# Patient Record
Sex: MALE | Race: WHITE | NOT HISPANIC OR LATINO | Employment: FULL TIME | URBAN - METROPOLITAN AREA
[De-identification: names, ages, dates, MRNs, and addresses within clinical notes are randomized per-mention and may not be internally consistent; named-entity substitution may affect disease eponyms.]

---

## 2017-02-08 ENCOUNTER — ALLSCRIPTS OFFICE VISIT (OUTPATIENT)
Dept: OTHER | Facility: OTHER | Age: 47
End: 2017-02-08

## 2017-04-10 ENCOUNTER — GENERIC CONVERSION - ENCOUNTER (OUTPATIENT)
Dept: OTHER | Facility: OTHER | Age: 47
End: 2017-04-10

## 2017-07-26 ENCOUNTER — ALLSCRIPTS OFFICE VISIT (OUTPATIENT)
Dept: OTHER | Facility: OTHER | Age: 47
End: 2017-07-26

## 2017-07-26 DIAGNOSIS — M25.511 PAIN IN RIGHT SHOULDER: ICD-10-CM

## 2017-11-27 ENCOUNTER — GENERIC CONVERSION - ENCOUNTER (OUTPATIENT)
Dept: OTHER | Facility: OTHER | Age: 47
End: 2017-11-27

## 2017-12-22 ENCOUNTER — GENERIC CONVERSION - ENCOUNTER (OUTPATIENT)
Dept: OTHER | Facility: OTHER | Age: 47
End: 2017-12-22

## 2017-12-22 ENCOUNTER — OFFICE VISIT (OUTPATIENT)
Dept: URGENT CARE | Age: 47
End: 2017-12-22
Payer: COMMERCIAL

## 2017-12-22 PROCEDURE — S9083 URGENT CARE CENTER GLOBAL: HCPCS | Performed by: FAMILY MEDICINE

## 2017-12-22 PROCEDURE — 93005 ELECTROCARDIOGRAM TRACING: CPT

## 2017-12-22 PROCEDURE — G0383 LEV 4 HOSP TYPE B ED VISIT: HCPCS | Performed by: FAMILY MEDICINE

## 2017-12-23 LAB
ATRIAL RATE: 76 BPM
ATRIAL RATE: 78 BPM
P AXIS: 56 DEGREES
P AXIS: 57 DEGREES
PR INTERVAL: 136 MS
PR INTERVAL: 142 MS
QRS AXIS: 63 DEGREES
QRS AXIS: 71 DEGREES
QRSD INTERVAL: 78 MS
QRSD INTERVAL: 82 MS
QT INTERVAL: 366 MS
QT INTERVAL: 372 MS
QTC INTERVAL: 411 MS
QTC INTERVAL: 424 MS
T WAVE AXIS: 38 DEGREES
T WAVE AXIS: 41 DEGREES
VENTRICULAR RATE: 76 BPM
VENTRICULAR RATE: 78 BPM

## 2017-12-28 ENCOUNTER — ALLSCRIPTS OFFICE VISIT (OUTPATIENT)
Dept: OTHER | Facility: OTHER | Age: 47
End: 2017-12-28

## 2017-12-29 NOTE — PROGRESS NOTES
Assessment   1  Acute bacterial sinusitis (461 9) (J01 90,B96 89)    Plan   Acute bacterial sinusitis    · Amoxicillin 875 MG Oral Tablet; TAKE 1 TABLET EVERY 12 HOURS DAILY    Discussion/Summary      Given the persistence of symptoms and sick contacts with children at home, he likely has an acute bacterial bronchitis  He was given Amoxicillin to complete and to continue to monitor symptoms  Otherwise no other changes were made  The patient was counseled regarding instructions for management  Possible side effects of new medications were reviewed with the patient/guardian today  The treatment plan was reviewed with the patient/guardian  The patient/guardian understands and agrees with the treatment plan      Chief Complaint   1  Cold Symptoms  Patient is here for cough and lost voice      History of Present Illness   HPI: Siobhan Bhatia is here today for a sick visit  Symptoms started about 2 weeks ago  He has not noted any improvement since  Fevers have improved but still notes the below symptoms  He reports trying to rest to help  Cold Symptoms:      Associated symptoms include hoarseness,-- productive cough,-- facial pain,-- plugged ear(s),-- fatigue,-- nausea-- and-- fever, but-- no scratchy throat-- and-- no sore throat  Active Problems   1  Acid reflux (530 81) (K21 9)   2  Anxiety (300 00) (F41 9)   3  Arthralgia of right shoulder region (719 41) (M25 511)   4  Epistaxis (784 7) (R04 0)   5  Herniated nucleus pulposus, C4-5 left (722 0) (M50 221)   6  Hyperlipidemia (272 4) (E78 5)   7  Insomnia (780 52) (G47 00)   8  Lipoma (214 9) (D17 9)   9  Seasonal allergies (477 9) (J30 2)   10  Seborrheic keratosis (702 19) (L82 1)   11  Vitamin D deficiency (268 9) (E55 9)    Social History    · Being A Social Drinker   · Marital History - Currently    · Never a smoker   · Work History   · Working Full Time   · audio/tv installment in houses    Current Meds    1   Aspirin 81 MG TABS; TAKE 1 TABLET DAILY; Therapy: 17QBA8767 to (Evaluate:25Cyt3529) Recorded   2  Atorvastatin Calcium 40 MG Oral Tablet; TAKE 1 TABLET DAILY; Therapy: 64VEQ8306 to (Evaluate:05Apr2018)  Requested for: 10Apr2017; Last     Rx:10Apr2017 Ordered   3  Diclofenac Potassium 50 MG Oral Tablet; TAKE 1 TABLET 2 TO 3 TIMES DAILY AFTER     MEALS; Therapy: 88TWL1457 to (Evaluate:62Jnw9907)  Requested for: 99AOO8042; Last     Rx:14Wxt4479 Ordered   4  Montelukast Sodium 10 MG Oral Tablet; TAKE 1 TABLET BY MOUTH ONCE DAILY; Therapy: 23DMK2088 to (Evaluate:15Mar2016)  Requested for: 87OYM1251; Last     Rx:29Enh9027 Ordered   5  Multiple Vitamins Essential Oral Tablet; Therapy: 75YSY4643 to Recorded   6  Ondansetron HCl - 8 MG Oral Tablet; Take one tab every 8 hours as needed for nausea; Therapy: 36Soz1046 to (Last [place])  Requested for: 50Xfg0168 Ordered   7  Ventolin  (90 Base) MCG/ACT Inhalation Aerosol Solution; Therapy: 29EUW7211 to Recorded   8  Vitamin D 1000 UNIT Oral Tablet; TAKE 3 TABLET Daily; Therapy: 43NVK7393 to Recorded   9  Zolpidem Tartrate 5 MG Oral Tablet; TAKE 1 TABLET BY MOUTH AT BEDTIME AS     NEEDED INSOMNIA; Therapy: 51BWF8002 to (Evaluate:20Jan2018)  Requested for: 21Nov2017; Last     Rx:21Nov2017 Ordered    Allergies   1  No Known Drug Allergies    Vitals    Recorded: 28Dec2017 09:06AM   Temperature 98 7 F, Tympanic   Heart Rate 80   Pulse Quality Normal   Respiration Quality Normal   Respiration 18   Systolic 209, LUE, Sitting   Diastolic 60, LUE, Sitting   BP CUFF SIZE Large   Height 5 ft 10 in   Weight 187 lb 6 oz   BMI Calculated 26 89   BSA Calculated 2 03   O2 Saturation 94, RA     Physical Exam        Constitutional      General appearance: No acute distress, well appearing and well nourished  Eyes      Conjunctiva and lids: No swelling, erythema, or discharge         Ears, Nose, Mouth, and Throat      External inspection of ears and nose: Normal        Otoscopic examination: Tympanic membrance translucent with normal light reflex  Canals patent without erythema  Nasal mucosa, septum, and turbinates: Abnormal  -- Minimal discomfort on palpation over sinuses  Oropharynx: Normal with no erythema, edema, exudate or lesions  Pulmonary      Respiratory effort: No increased work of breathing or signs of respiratory distress  Auscultation of lungs: Clear to auscultation, equal breath sounds bilaterally, no wheezes, no rales, no rhonci  Cardiovascular      Auscultation of heart: Normal rate and rhythm, normal S1 and S2, without murmurs  Lymphatic      Palpation of lymph nodes in neck: Abnormal   bilateral submandibular node enlargement  Psychiatric      Orientation to person, place and time: Normal        Mood and affect: Normal           Results/Data   PHQ-2 Adult Depression Screening 34Shc0989 09:10AM User, [facility]      Test Name Result Flag Reference   PHQ-2 Adult Depression Score 0     Over the last two weeks, how often have you been bothered by any of the following problems?      Little interest or pleasure in doing things: Not at all - 0     Feeling down, depressed, or hopeless: Not at all - 0   PHQ-2 Adult Depression Screening Negative          Signatures    Electronically signed by : Beverly Marquez DO; Dec 28 2017  9:30AM EST                       (Author)

## 2018-01-12 VITALS
WEIGHT: 188.38 LBS | SYSTOLIC BLOOD PRESSURE: 102 MMHG | HEART RATE: 74 BPM | HEIGHT: 70 IN | RESPIRATION RATE: 16 BRPM | BODY MASS INDEX: 26.97 KG/M2 | DIASTOLIC BLOOD PRESSURE: 60 MMHG | OXYGEN SATURATION: 95 %

## 2018-01-15 VITALS
OXYGEN SATURATION: 98 % | RESPIRATION RATE: 18 BRPM | DIASTOLIC BLOOD PRESSURE: 70 MMHG | HEIGHT: 70 IN | SYSTOLIC BLOOD PRESSURE: 106 MMHG | HEART RATE: 67 BPM | BODY MASS INDEX: 26.52 KG/M2 | WEIGHT: 185.25 LBS

## 2018-01-22 VITALS
BODY MASS INDEX: 26.82 KG/M2 | SYSTOLIC BLOOD PRESSURE: 108 MMHG | WEIGHT: 187.38 LBS | OXYGEN SATURATION: 94 % | DIASTOLIC BLOOD PRESSURE: 60 MMHG | RESPIRATION RATE: 18 BRPM | HEART RATE: 80 BPM | HEIGHT: 70 IN | TEMPERATURE: 98.7 F

## 2018-01-24 VITALS
HEIGHT: 70 IN | OXYGEN SATURATION: 97 % | BODY MASS INDEX: 26.92 KG/M2 | SYSTOLIC BLOOD PRESSURE: 136 MMHG | HEART RATE: 80 BPM | RESPIRATION RATE: 20 BRPM | TEMPERATURE: 97.8 F | DIASTOLIC BLOOD PRESSURE: 82 MMHG | WEIGHT: 188 LBS

## 2018-01-24 NOTE — PROGRESS NOTES
Assessment    1  Nausea with vomiting (787 01) (R11 2)   2  Epistaxis (784 7) (R04 0)   3  Viral infection (079 99) (B34 9)    Plan  Viral infection    · Ondansetron HCl - 8 MG Oral Tablet (Zofran); Take one tab every 8 hours as  needed for nausea    Discussion/Summary  Discussion Summary:   Continue with Tylenol, ibuprofen, cough suppressants as necessary  use Zofran when needed for nausea and vomiting  EKG looks good here, follow up with ER and Cardiology for any cardiac type chest pain  We discussed at length concerning cardiac symptoms that would prompt a visit to the emergency room  The patient is understanding and in agreement  For the nose bleeds, use a moisturizing ointment as tolerated  drink plenty of fluids to stay hydrated  Return to PCP for symptoms lasting longer than 2 weeks  Go to the ER for any distressing symptoms, as discussed  Understands and agrees with treatment plan: The treatment plan was reviewed with the patient/guardian  The patient/guardian understands and agrees with the treatment plan      Chief Complaint  Chief Complaint Free Text Note Form: had flu sx for about 1 week, today though had nausea and vomited up mucous with copious amounts of red blood  no fevers or chills  cash      History of Present Illness  HPI: This Is a 79-year-old male presenting for flu-like symptoms times 6 days  He reports that he had chills, fever, productive cough, body aches with a T-max of 101 9 degrees   His symptoms have been slowly improving, and today he began having nausea with "dry heaving " He denies any abdominal pain, diarrhea  He also reports an episode of bright red blood when he blew his nose  He is not on any blood thinners and denies any fuentes epistaxis  He reports that he has a strong family history of heart disease and has been having "muscular chest pain "x5 days  He is not currently having any chest pain  He denies any shortness of breath, dizziness     We talked at length about his wife has recently been in and out of the hospital and very ill  He is very emotionally stressed about this and very worried about her  He is concerned about this intermittent chest pain, although he states he thinks it is muscular, because of the strong family history  Hospital Based Practices Required Assessment:   Abuse And Domestic Violence Screen    Yes, the patient is safe at home  The patient states no one is hurting them  Depression And Suicide Screen  No, the patient has not had thoughts of hurting themself  No, the patient has not felt depressed in the past 7 days  Review of Systems  Focused-Male:   Constitutional: fever, feeling poorly, chills and feeling tired, but as noted in HPI    ENT: nosebleeds, sore throat, nasal discharge and hoarseness, but as noted in HPI, no earache and no hearing loss  Cardiovascular: chest pain, but the heart rate was not slow, no intermittent leg claudication, the heart rate was not fast, no palpitations and no lower extremity edema  Respiratory: cough and PND, but no shortness of breath, no wheezing and no shortness of breath during exertion  Gastrointestinal: nausea and vomiting, but no abdominal pain, no constipation, no diarrhea and no blood in stools  Musculoskeletal: myalgias  Integumentary: no rashes  Neurological: no confusion, no dizziness and no fainting  Active Problems    1  Acid reflux (530 81) (K21 9)   2  Anxiety (300 00) (F41 9)   3  Arthralgia of right shoulder region (719 41) (M25 511)   4  Cough (786 2) (R05)   5  Herniated nucleus pulposus, C4-5 left (722 0) (M50 221)   6  Hyperlipidemia (272 4) (E78 5)   7  Insomnia (780 52) (G47 00)   8  Lipoma (214 9) (D17 9)   9  Seasonal allergies (477 9) (J30 2)   10  Seborrheic keratosis (702 19) (L82 1)   11  Vitamin D deficiency (268 9) (E55 9)    Past Medical History    1  History of fracture of rib (V15 51) (Z87 81)    Family History  Mother    1   Family history of malignant neoplasm (V16 9) (Z80 9)  Father    2  FH: premature coronary heart disease (V17 3) (Z82 49)  Maternal Relatives    3  Family history of Multiple allergies  Paternal Relatives    4  FH: premature coronary heart disease (V17 3) (Z82 49)    Social History    · Being A Social Drinker   · Marital History - Currently    · Never a smoker   · Work History   · Working Full Time    Surgical History    1  History of Surgery Vas Deferens Vasectomy    Current Meds   1  Aspirin 81 MG TABS; TAKE 1 TABLET DAILY; Therapy: 72HXN5496 to (Evaluate:95Xch7472) Recorded   2  Atorvastatin Calcium 40 MG Oral Tablet; TAKE 1 TABLET DAILY; Therapy: 03QYL3280 to (Evaluate:05Apr2018)  Requested for: 14Imm2889; Last   Rx:10Apr2017 Ordered   3  Diclofenac Potassium 50 MG Oral Tablet; TAKE 1 TABLET 2 TO 3 TIMES DAILY AFTER   MEALS; Therapy: 48RBQ0354 to (Evaluate:49Zti9113)  Requested for: 41ARZ6111; Last   Rx:74Mtv3127 Ordered   4  Montelukast Sodium 10 MG Oral Tablet; TAKE 1 TABLET BY MOUTH ONCE DAILY; Therapy: 86YKT7511 to (Evaluate:15Mar2016)  Requested for: 12DPC2151; Last   Rx:50Fzb6280 Ordered   5  Multiple Vitamins Essential Oral Tablet; Therapy: 63QRQ1509 to Recorded   6  Ventolin  (90 Base) MCG/ACT Inhalation Aerosol Solution; Therapy: 08VMY7491 to Recorded   7  Vitamin D 1000 UNIT Oral Tablet; TAKE 3 TABLET Daily; Therapy: 78AXE0322 to Recorded   8  Zolpidem Tartrate 5 MG Oral Tablet; TAKE 1 TABLET BY MOUTH AT BEDTIME AS   NEEDED INSOMNIA; Therapy: 87QVF8874 to (Evaluate:20Jan2018)  Requested for: 21Nov2017; Last   Rx:21Nov2017 Ordered    Allergies    1   No Known Drug Allergies    Vitals  Signs   Recorded: 22Dec2017 08:42PM   Temperature: 97 8 F, Oral  Heart Rate: 80  Respiration: 20  Systolic: 722  Diastolic: 82  Height: 5 ft 10 in  Weight: 188 lb   BMI Calculated: 26 98  BSA Calculated: 2 03  O2 Saturation: 97  Pain Scale: 2    Physical Exam    Constitutional   General appearance: Abnormal  Patient appears tired, but no apparent distress, well developed well nourished  Eyes   Conjunctiva and lids: No swelling, erythema, or discharge  Ears, Nose, Mouth, and Throat   External inspection of ears and nose: Normal     Otoscopic examination: Tympanic membrance translucent with normal light reflex  Canals patent without erythema  Nasal mucosa, septum, and turbinates: Abnormal   Intact, erythematous nasal mucosa  Clear nasal discharge  Oropharynx: Normal with no erythema, edema, exudate or lesions  Pulmonary   Respiratory effort: No increased work of breathing or signs of respiratory distress  Auscultation of lungs: Clear to auscultation  Cardiovascular   Auscultation of heart: Normal rate and rhythm, normal S1 and S2, without murmurs  Abdomen   Abdomen: Abnormal   Mild discomfort diffusely in her abdomen but no point tenderness no sharp pain  Positive bowel sounds x4  Negative Teri fees, negative McBurney's point  Lymphatic   Palpation of lymph nodes in neck: No lymphadenopathy  Musculoskeletal   Gait and station: Normal     Inspection/palpation of joints, bones, and muscles: Abnormal   muscular chest pain reproducible diffusely around chest    Psychiatric   Orientation to person, place and time: Normal     Mood and affect: Normal        Results/Data  ECG: A 12 lead ECG was performed and was normal    Rhythm and rate: normal sinus rhythm     P-waves: the P wave is normal    QRS: the QRS is normal    ST segment: the ST segments are normal       Signatures   Electronically signed by : MAGUI Yang; Dec 22 2017 11:29PM EST                       (Author)    Electronically signed by : ALICIA Obrien ; Dec 31 2017  2:49PM EST                       (Co-author)

## 2018-06-04 ENCOUNTER — OFFICE VISIT (OUTPATIENT)
Dept: OBGYN CLINIC | Facility: HOSPITAL | Age: 48
End: 2018-06-04
Payer: COMMERCIAL

## 2018-06-04 VITALS
BODY MASS INDEX: 27.2 KG/M2 | DIASTOLIC BLOOD PRESSURE: 78 MMHG | HEIGHT: 70 IN | SYSTOLIC BLOOD PRESSURE: 102 MMHG | WEIGHT: 190 LBS | HEART RATE: 78 BPM

## 2018-06-04 DIAGNOSIS — M54.12 CERVICAL RADICULOPATHY: Primary | ICD-10-CM

## 2018-06-04 DIAGNOSIS — M48.02 CERVICAL STENOSIS OF SPINAL CANAL: ICD-10-CM

## 2018-06-04 PROCEDURE — 99203 OFFICE O/P NEW LOW 30 MIN: CPT | Performed by: ORTHOPAEDIC SURGERY

## 2018-06-04 NOTE — PROGRESS NOTES
Assessment/Plan:    Patient seen and examined by Dr Carol Pearson and myself  He presents with intermittent neck and RUE radicular pain  He has a history of cervical DDD at C4-5 (left disc protrusion) noted on MRI in 2015  Our recommendation at this time is repeat the MRI of the cervical spine as he has completed several months of physical therapy  Follow-up in 1-2 weeks after MRI to review the study  Problem List Items Addressed This Visit     Cervical radiculopathy - Primary    Cervical stenosis of spinal canal            Subjective:      Patient ID: Aranza Rios is a 51 y o  male  HPI    The patient is a 50-year-old male who presents for initial evaluation with Dr Carol Pearson  He states that over the last six months he has been experiencing right-sided neck pain that radiates down his entire right upper extremity  This has been intermittent and he describes the pain as burning  Nothing specific brought this on and has been relieved with home physical therapy as well as ibuprofen p r n  He denies any injury or neck surgery  He denies any associated numbness and tingling, no noticeable upper extremity weakness  He denies any gait instability no trouble gripping fine objects  He notes that over the last four months he has been doing physical therapy with significant improvement, however he still gets periods of intense intermittent burning pain of the right upper extremity  He feels the extremity pain is worse than the neck pain  The following portions of the patient's history were reviewed and updated as appropriate: allergies, current medications, past family history, past medical history, past social history, past surgical history and problem list     Review of Systems   Constitutional: Negative for appetite change, chills, diaphoresis, fatigue and fever  Respiratory: Negative for chest tightness, shortness of breath and wheezing      Cardiovascular: Negative for chest pain, palpitations and leg swelling  Gastrointestinal: Negative for abdominal pain and vomiting  Genitourinary: Negative for decreased urine volume and difficulty urinating  Musculoskeletal: Positive for arthralgias, neck pain and neck stiffness  Skin: Negative for pallor, rash and wound  Neurological: Negative for weakness and numbness  Objective:      /78   Pulse 78   Ht 5' 10" (1 778 m)   Wt 86 2 kg (190 lb)   BMI 27 26 kg/m²          Physical Exam   Constitutional: He is oriented to person, place, and time  He appears well-developed and well-nourished  No distress  HENT:   Head: Normocephalic and atraumatic  Cardiovascular: Intact distal pulses  Pulmonary/Chest: Effort normal    Musculoskeletal: He exhibits no edema or tenderness  Neurological: He is alert and oriented to person, place, and time  Skin: Skin is warm and dry  No rash noted  He is not diaphoretic  No erythema  No pallor  Psychiatric: He has a normal mood and affect  Nursing note and vitals reviewed  NAD  Gait normal   Inspection reveals no open wounds or erythema  Cervical ROM is normal   Discomfort with Spurlings test to the right  Axial traction provides relief  TTP right trapezius muscle  Negative Ragsdale's sign bilaterally  No sustained clonus  No hyperreflexia  Strength is 5/5 C5-T1  Sensation diminished entire RUE compared to the contralateral side  Normal reflexes at C5,6,7  + radial pulses bilaterally

## 2018-06-06 ENCOUNTER — HOSPITAL ENCOUNTER (OUTPATIENT)
Dept: RADIOLOGY | Facility: HOSPITAL | Age: 48
Discharge: HOME | End: 2018-06-06
Payer: COMMERCIAL

## 2018-06-06 DIAGNOSIS — M48.02 CERVICAL STENOSIS OF SPINAL CANAL: ICD-10-CM

## 2018-06-06 DIAGNOSIS — M54.12 CERVICAL RADICULOPATHY: ICD-10-CM

## 2018-06-06 PROCEDURE — 72141 MRI NECK SPINE W/O DYE: CPT

## 2018-06-14 ENCOUNTER — OFFICE VISIT (OUTPATIENT)
Dept: OBGYN CLINIC | Facility: HOSPITAL | Age: 48
End: 2018-06-14
Payer: COMMERCIAL

## 2018-06-14 VITALS
BODY MASS INDEX: 27.2 KG/M2 | SYSTOLIC BLOOD PRESSURE: 122 MMHG | DIASTOLIC BLOOD PRESSURE: 80 MMHG | HEIGHT: 70 IN | WEIGHT: 190 LBS | HEART RATE: 76 BPM

## 2018-06-14 DIAGNOSIS — G89.29 CHRONIC RIGHT SHOULDER PAIN: ICD-10-CM

## 2018-06-14 DIAGNOSIS — M54.12 CERVICAL RADICULOPATHY: Primary | ICD-10-CM

## 2018-06-14 DIAGNOSIS — M25.511 CHRONIC RIGHT SHOULDER PAIN: ICD-10-CM

## 2018-06-14 PROCEDURE — 99213 OFFICE O/P EST LOW 20 MIN: CPT | Performed by: ORTHOPAEDIC SURGERY

## 2018-06-14 NOTE — PROGRESS NOTES
Assessment/Plan:  Cervical DDD C4-5 without significant stenosis  Upper extremity pains does not seem radicular at this point  Pain seen more inflammatory and possibly of shoulder etiology  I recommend initiation of physical therapy  Follow-up rheumatology  If no improvement we will consider further workup of the right shoulder  No problem-specific Assessment & Plan notes found for this encounter  There are no diagnoses linked to this encounter  Subjective:      Patient ID: Isabella Chan is a 51 y o  male  HPI  Patient presents for follow-up regarding neck pain which is chronic in nature  He did receive the MRI of the cervical spine and is here to review the study  He also complains of ongoing bilateral upper extremity pain from the shoulders down to his wrist right side worse than left  Symptoms are worse with activity and range of motion  Review of Systems    Chronic neck pain  Bilateral shoulder and arm pain right side worse than left    Objective:      /80   Pulse 76   Ht 5' 10" (1 778 m)   Wt 86 2 kg (190 lb)   BMI 27 26 kg/m²          Physical Exam    He appears stated age in well-developed in no acute distress  He demonstrates 5/5 strength C5 through T1 bilaterally  Negative Evangelista  Negative Neer's  Equivocal Yergason's test on the right equivocal speed's test on the right    Cervical spine MRI is reviewed and this demonstrates DDD at C4-5 with mild bulging  No significant stenosis  No cord compression

## 2018-06-20 ENCOUNTER — OFFICE VISIT (OUTPATIENT)
Dept: INTERNAL MEDICINE CLINIC | Facility: CLINIC | Age: 48
End: 2018-06-20
Payer: COMMERCIAL

## 2018-06-20 VITALS
DIASTOLIC BLOOD PRESSURE: 70 MMHG | OXYGEN SATURATION: 98 % | TEMPERATURE: 98.2 F | HEIGHT: 70 IN | HEART RATE: 92 BPM | SYSTOLIC BLOOD PRESSURE: 118 MMHG | BODY MASS INDEX: 26.57 KG/M2 | WEIGHT: 185.6 LBS | RESPIRATION RATE: 18 BRPM

## 2018-06-20 DIAGNOSIS — J01.90 ACUTE BACTERIAL SINUSITIS: ICD-10-CM

## 2018-06-20 DIAGNOSIS — B96.89 ACUTE BACTERIAL SINUSITIS: ICD-10-CM

## 2018-06-20 DIAGNOSIS — J33.9 NASAL POLYP: Primary | ICD-10-CM

## 2018-06-20 PROCEDURE — 3008F BODY MASS INDEX DOCD: CPT | Performed by: INTERNAL MEDICINE

## 2018-06-20 PROCEDURE — 99214 OFFICE O/P EST MOD 30 MIN: CPT | Performed by: INTERNAL MEDICINE

## 2018-06-20 RX ORDER — ALBUTEROL SULFATE 90 UG/1
2 AEROSOL, METERED RESPIRATORY (INHALATION) EVERY 6 HOURS PRN
COMMUNITY

## 2018-06-20 RX ORDER — ZOLPIDEM TARTRATE 12.5 MG/1
5 TABLET, FILM COATED, EXTENDED RELEASE ORAL
COMMUNITY
End: 2018-09-20

## 2018-06-20 RX ORDER — FLUTICASONE PROPIONATE 110 UG/1
2 AEROSOL, METERED RESPIRATORY (INHALATION) 2 TIMES DAILY PRN
COMMUNITY

## 2018-06-20 RX ORDER — AMOXICILLIN AND CLAVULANATE POTASSIUM 875; 125 MG/1; MG/1
1 TABLET, FILM COATED ORAL EVERY 12 HOURS SCHEDULED
Qty: 20 TABLET | Refills: 0 | Status: SHIPPED | OUTPATIENT
Start: 2018-06-20 | End: 2018-06-22

## 2018-06-20 NOTE — PROGRESS NOTES
Assessment/Plan:     Diagnoses and all orders for this visit:    Nasal polyp  -     Ambulatory Referral to Otolaryngology; Future    Acute bacterial sinusitis  -     amoxicillin-clavulanate (AUGMENTIN) 875-125 mg per tablet; Take 1 tablet by mouth every 12 (twelve) hours for 10 days    Other orders  -     albuterol (PROVENTIL HFA,VENTOLIN HFA) 90 mcg/act inhaler; Inhale 2 puffs every 6 (six) hours as needed  -     fluticasone (FLOVENT HFA) 110 MCG/ACT inhaler; Inhale 2 puffs 2 (two) times a day Rinse mouth after use  -     zolpidem (AMBIEN CR) 12 5 MG CR tablet; Take 5 mg by mouth daily at bedtime as needed      Remigio Blanchard was seen and examined in the office today  On exam, he was noted to have mucosal edema along with injected/bulging TM on the right with some canal erythema  He was also noted to have a rather large polyp/mass noted on the right nares  I am going to treat him with Augmentin and asked that he let me know if he does not note improvement in the next 24-36 hours  I have also referred him to ENT to have a look at the nasal lesion  Otherwise no other changes were made  Subjective:      Patient ID: Peg Joseph is a 51 y o  male  Remigio Blanchard is here today for a sick visit  He reports that symptoms started about 1 5 weeks ago  It started with a head cold and it seemed to get better  He was having low grade fevers but then it seemed to worsen  He reports going swimming on Monday  He started noticing severe ear pain on the right along with a temperature of 102  See below  Sick contacts including a son with some URI symptoms about 1 week ago  URI    This is a new problem  The current episode started 1 to 4 weeks ago  The problem has been gradually worsening  The maximum temperature recorded prior to his arrival was 101 - 101 9 F  The fever has been present for 3 to 4 days  Associated symptoms include congestion, coughing, ear pain and swollen glands   Pertinent negatives include no abdominal pain, chest pain, diarrhea, headaches, nausea, plugged ear sensation, rhinorrhea, sinus pain, sneezing, sore throat, vomiting or wheezing  He has tried inhaler use and NSAIDs (Robitussin DM, steam ) for the symptoms  The treatment provided mild relief  The following portions of the patient's history were reviewed and updated as appropriate: allergies, current medications, past family history, past medical history, past social history, past surgical history and problem list     Review of Systems   HENT: Positive for congestion and ear pain  Negative for rhinorrhea, sinus pain, sneezing and sore throat  Respiratory: Positive for cough  Negative for wheezing  Cardiovascular: Negative for chest pain  Gastrointestinal: Negative for abdominal pain, diarrhea, nausea and vomiting  Neurological: Negative for headaches  Objective:      /70 (BP Location: Left arm, Patient Position: Sitting, Cuff Size: Adult)   Pulse 92   Temp 98 2 °F (36 8 °C) (Tympanic)   Resp 18   Ht 5' 10" (1 778 m)   Wt 84 2 kg (185 lb 9 6 oz)   SpO2 98%   BMI 26 63 kg/m²          Physical Exam   Constitutional: He appears well-developed and well-nourished  No distress  HENT:   Head: Normocephalic and atraumatic  Right Ear: There is swelling  Tympanic membrane is injected and bulging  A middle ear effusion is present  Left Ear: External ear normal    Nose: Mucosal edema present  Right sinus exhibits maxillary sinus tenderness  Mouth/Throat: Oropharynx is clear and moist  No oropharyngeal exudate  Eyes: Conjunctivae are normal    Skin: He is not diaphoretic  Vitals reviewed

## 2018-06-22 ENCOUNTER — TELEPHONE (OUTPATIENT)
Dept: INTERNAL MEDICINE CLINIC | Facility: CLINIC | Age: 48
End: 2018-06-22

## 2018-06-22 DIAGNOSIS — B96.89 ACUTE BACTERIAL SINUSITIS: Primary | ICD-10-CM

## 2018-06-22 DIAGNOSIS — J01.90 ACUTE BACTERIAL SINUSITIS: Primary | ICD-10-CM

## 2018-06-22 RX ORDER — LEVOFLOXACIN 500 MG/1
500 TABLET, FILM COATED ORAL EVERY 24 HOURS
Qty: 5 TABLET | Refills: 0 | Status: SHIPPED | OUTPATIENT
Start: 2018-06-22 | End: 2018-06-27

## 2018-06-25 ENCOUNTER — EVALUATION (OUTPATIENT)
Dept: PHYSICAL THERAPY | Facility: REHABILITATION | Age: 48
End: 2018-06-25
Payer: COMMERCIAL

## 2018-06-25 DIAGNOSIS — M54.12 CERVICAL RADICULOPATHY: ICD-10-CM

## 2018-06-25 DIAGNOSIS — G89.29 CHRONIC RIGHT SHOULDER PAIN: ICD-10-CM

## 2018-06-25 DIAGNOSIS — M25.511 CHRONIC RIGHT SHOULDER PAIN: ICD-10-CM

## 2018-06-25 PROCEDURE — G8991 OTHER PT/OT GOAL STATUS: HCPCS | Performed by: PHYSICAL THERAPIST

## 2018-06-25 PROCEDURE — 97110 THERAPEUTIC EXERCISES: CPT | Performed by: PHYSICAL THERAPIST

## 2018-06-25 PROCEDURE — 97162 PT EVAL MOD COMPLEX 30 MIN: CPT | Performed by: PHYSICAL THERAPIST

## 2018-06-25 PROCEDURE — G8990 OTHER PT/OT CURRENT STATUS: HCPCS | Performed by: PHYSICAL THERAPIST

## 2018-06-25 NOTE — PROGRESS NOTES
PT Evaluation     Today's date: 2018  Patient name: Ruperto Perez  : 1970  MRN: [id number]  Referring provider: Cassy Guerra MD  Dx:   Encounter Diagnosis     ICD-10-CM    1  Cervical radiculopathy M54 12 Ambulatory referral to Physical Therapy   2  Chronic right shoulder pain M25 511 Ambulatory referral to Physical Therapy    G89 29                   Assessment  Impairments: abnormal or restricted ROM, impaired physical strength, pain with function and poor posture   Patient presents with symptom irritability no  Assessment details: Ruperto Perez is a 51 y o  male with significant medical history of chronic shoulder and cervical pain who presents with chief complaint of recurrent R shoulder and cervical pain  Millicent Spears presents with evaluation findings of increased radial nerve tension, decreased R shoulder IR ROM, decreased scapular and ER strength and poor postural mechanics  These deficits are manifested functionally in difficulty with overhead activities and typing for long periods of time  Millicent Spears will benefit from physical therapy to improve shoulder ROM and strength, decrease neural tension and allow for decreased pain during work related activity  Understanding of Dx/Px/POC: good   Prognosis: good    Goals  Short Term  1: Patient will report a 50% decrease in pain in 2-4 weeks  2: Pt will have R shoulder ROM equal to the left in 4 weeks  3  Pt will have scapular strength 4+/5 bilaterally in 4 weeks  Long Term  1: Patient will demonstrate independence in home exercise program by discharge  2: Patient will have FOTO score of 65 indicating improved function in 8 weeks       Plan  Patient would benefit from: skilled physical therapy  Planned therapy interventions: neuromuscular re-education, joint mobilization, manual therapy, therapeutic exercise and postural training  Frequency: 2x week  Duration in visits: 12  Treatment plan discussed with: patient        Subjective Evaluation    History of Present Illness  Mechanism of injury: Pt reports that he has had pain 3-4 years ago  States that the first time he had his neck lock with extension  States he had imaging and PT in the past that helped it clear up  States that about a year ago he started having intense pain in his R shoulder  States that it was intermittent initially however it became worse in intensity and consistency  States that he has a minor bulging disc and has nerve pain  States that his physician also suspects something systemic  States he has increased symptoms with texting and typing  Pt reports that he is a tense person and feels that is contributing as well  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  States that he has  Been stretching recently which has helped  Pt reports pain increases down his arm with cervical extension  Systemic involvement includes multiple joint pain  Pain  Current pain ratin  At best pain ratin  At worst pain ratin          Objective     Special Questions  Negative for dizziness, faints, headaches and nausea/motion sickness    Postural Observations  Seated posture: poor  Standing posture: poor        Palpation   Left   Trigger point to levator scapulae and upper trapezius  Right   Trigger point to levator scapulae and upper trapezius  Tenderness   Cervical Spine   No tenderness in the spinous process, left transverse process and right transverse process  Left Shoulder   No tenderness in the [facility] joint, clavicle, infraspinatus tendon, subscapularis tendon and supraspinatus tendon  Right Shoulder  Tenderness in the infraspinatus tendon and subscapularis tendon  No tenderness in the [facility] joint, clavicle and supraspinatus tendon       Additional Tenderness Details  1st rib tenderness on the R, mild upper cervical hypomobility on the R, thoracic spine WNL    Neurological Testing Sensation   Cervical/Thoracic   Left   Intact: light touch    Right   Intact: light touch    Reflexes   Left   Biceps (C5/C6): normal (2+)  Brachioradialis (C6): normal (2+)    Right   Biceps (C5/C6): normal (2+)  Brachioradialis (C6): normal (2+)    Active Range of Motion   Cervical/Thoracic Spine   Cervical    Flexion: WFL  Extension: WFL  Left rotation: WFL  Right rotation: [facility]    Additional Active Range of Motion Details  Mild decreased with rotation to the R      Passive Range of Motion   Cervical/Thoracic Spine   Normal passive range of motion  Cervical   Right lateral flexion: with pain  Left Shoulder   Internal rotation 90°: 80 degrees     Right Shoulder   Internal rotation 90°: 45 degrees     Joint Play Joints within functional limits: C3, C4, C5, C6, C7, T1, T2, T3, T4, T5, T6, T7, T8, T9, T10, T11 and T12 Hypomobile: C1 and C2     Strength/Myotome Testing     Left Shoulder     Planes of Motion   Abduction: 5   External rotation at 0°: 5   Internal rotation at 0°: 5     Isolated Muscles   Middle trapezius: 4-     Right Shoulder     Planes of Motion   Abduction: 5   External rotation at 0°: 4-   Internal rotation at 0°: 5     Isolated Muscles   Middle trapezius: 4-     Left Elbow   Flexion: 5  Extension: 5    Right Elbow   Flexion: 5  Extension: 5    Left Wrist/Hand   Wrist extension: 5  Wrist flexion: 5    Right Wrist/Hand   Wrist extension: 5  Wrist flexion: 5    Tests   Cervical     Left   Negative cervical distraction, Spurling's sign and cervical compression test       Right   Negative cervical distraction, Spurling's sign and cervical compression test       Left Shoulder   Negative ULTT1, ULTT3 and ULTT4  Right Shoulder   Positive Hawkin's and ULTT3  Negative drop arm, empty can, external rotation lag sign, painful arc, ULTT1 and ULTT4             Precautions: n/a    Daily Treatment Diary     Manual              R shoulder PROM             R arm radial nerve glides Exercise Diary  6/25            UBE             scap retractions 10             Sleeper stretch 3x20"            Serratus punches             T's             BL ER             t-band rows             t-band extension             Wall slides                                                                                                                                                                Modalities

## 2018-07-03 ENCOUNTER — OFFICE VISIT (OUTPATIENT)
Dept: PHYSICAL THERAPY | Facility: REHABILITATION | Age: 48
End: 2018-07-03
Payer: COMMERCIAL

## 2018-07-03 DIAGNOSIS — M25.511 CHRONIC RIGHT SHOULDER PAIN: ICD-10-CM

## 2018-07-03 DIAGNOSIS — M54.12 CERVICAL RADICULOPATHY: Primary | ICD-10-CM

## 2018-07-03 DIAGNOSIS — G89.29 CHRONIC RIGHT SHOULDER PAIN: ICD-10-CM

## 2018-07-03 PROCEDURE — 97112 NEUROMUSCULAR REEDUCATION: CPT

## 2018-07-03 PROCEDURE — 97110 THERAPEUTIC EXERCISES: CPT

## 2018-07-03 PROCEDURE — 97140 MANUAL THERAPY 1/> REGIONS: CPT

## 2018-07-03 NOTE — PROGRESS NOTES
Daily Note     Today's date: 7/3/2018  Patient name: Estee Warner  : 1970  MRN: [id number]  Referring provider: Mercedes George MD  Dx:   Encounter Diagnosis     ICD-10-CM    1  Cervical radiculopathy M54 12    2  Chronic right shoulder pain M25 511     G89 29                   Subjective: Pt reports feeling better upon arrival, states that he's been adhering to HEP and stretching, has not had nerve pain in about a week  Objective: See treatment diary below    Precautions: n/a    Daily Treatment Diary     Manual  7/3            R shoulder PROM KP            R arm radial nerve glides                                                        Exercise Diary  6/25 7/3           UBE  3/3           scap retractions 10  10x10"           Sleeper stretch 3x20" 3x20"           Serratus punches  2x10 5"           T's  2x10           BL ER  2x10 OTB           t-band rows  2x10 OTB           t-band extension  2x10 OTB           Wall slides  10x10"                                                                                                                                                              Modalities                                                         Assessment: Tolerated treatment well  Patient exhibited good technique with therapeutic exercises and would benefit from continued PT  Pt required cuing for form during exercises, did well with corrections  Pt  able to complete all exercises with no increase in pain during or after session  Pt  1:1 with PTA for entirety  Plan: Continue per plan of care

## 2018-07-05 ENCOUNTER — OFFICE VISIT (OUTPATIENT)
Dept: PHYSICAL THERAPY | Facility: REHABILITATION | Age: 48
End: 2018-07-05
Payer: COMMERCIAL

## 2018-07-05 DIAGNOSIS — G89.29 CHRONIC RIGHT SHOULDER PAIN: ICD-10-CM

## 2018-07-05 DIAGNOSIS — M54.12 CERVICAL RADICULOPATHY: Primary | ICD-10-CM

## 2018-07-05 DIAGNOSIS — M25.511 CHRONIC RIGHT SHOULDER PAIN: ICD-10-CM

## 2018-07-05 PROCEDURE — 97140 MANUAL THERAPY 1/> REGIONS: CPT

## 2018-07-05 PROCEDURE — 97110 THERAPEUTIC EXERCISES: CPT

## 2018-07-05 PROCEDURE — 97112 NEUROMUSCULAR REEDUCATION: CPT

## 2018-07-05 NOTE — PROGRESS NOTES
Daily Note     Today's date: 2018  Patient name: Edie Mills  : 1970  MRN: [id number]  Referring provider: Collin Urias MD  Dx:   Encounter Diagnosis     ICD-10-CM    1  Cervical radiculopathy M54 12    2  Chronic right shoulder pain M25 511     G89 29                   Subjective: Pt reports some improvement upon arrival, states he has had no increases in pain      Objective: See treatment diary below    Precautions: n/a    Daily Treatment Diary     Manual  7/3 7/5           R shoulder PROM KP KP           R arm radial nerve glides  KP                                                      Exercise Diary  6/25 7/3 7/5          UBE  3/3 3/3          scap retractions 10  10x10" 10x10"          Sleeper stretch 3x20" 3x20" 3x20"          Serratus punches  2x10 5" 2x10 5"          T's  2x10 2x10          BL ER  2x10 OTB 2x10 OTB          t-band rows  2x10 OTB 2x10 OTB          t-band extension  2x10 OTB 2x10 OTB          Wall slides  10x10" 10x10"                                                                                                                                                             Modalities                                                         Assessment: Tolerated treatment well  Patient would benefit from continued PT   Pt  able to complete all exercises with no increase in pain during or after session  Pt notes relief with stretches and PROM  Pt  1:1 with PTA for entirety  Plan: Continue per plan of care

## 2018-07-09 ENCOUNTER — OFFICE VISIT (OUTPATIENT)
Dept: PHYSICAL THERAPY | Facility: REHABILITATION | Age: 48
End: 2018-07-09
Payer: COMMERCIAL

## 2018-07-09 DIAGNOSIS — M25.511 CHRONIC RIGHT SHOULDER PAIN: ICD-10-CM

## 2018-07-09 DIAGNOSIS — G89.29 CHRONIC RIGHT SHOULDER PAIN: ICD-10-CM

## 2018-07-09 DIAGNOSIS — M54.12 CERVICAL RADICULOPATHY: Primary | ICD-10-CM

## 2018-07-09 PROCEDURE — 97110 THERAPEUTIC EXERCISES: CPT

## 2018-07-09 PROCEDURE — 97140 MANUAL THERAPY 1/> REGIONS: CPT

## 2018-07-09 PROCEDURE — 97112 NEUROMUSCULAR REEDUCATION: CPT

## 2018-07-09 NOTE — PROGRESS NOTES
Daily Note     Today's date: 2018  Patient name: Andrés Waite  : 1970  MRN: [id number]  Referring provider: Krystin Sinclair MD  Dx:   Encounter Diagnosis     ICD-10-CM    1  Cervical radiculopathy M54 12    2  Chronic right shoulder pain M25 511     G89 29                   Subjective: Pt reports that he was extensively using shoulder this weekend, says it feels a little sore, but no nerve pain, and improved overall  Objective: See treatment diary below      Precautions: n/a    Daily Treatment Diary     Manual  7/3 7/5 7/9          R shoulder PROM KP KP KP          R arm radial nerve glides KP KP KP                                                     Exercise Diary  6/25 7/3 7/5 7/9         UBE  3/3 3/3 3/3         scap retractions 10  10x10" 10x10" 10x10"         Sleeper stretch 3x20" 3x20" 3x20" 3x30"         Serratus punches  2x10 5" 2x10 5" 2x10 5"         T's  2x10 2x10 2x10         BL ER  2x10 OTB 2x10 OTB 2x10 OTB         t-band rows  2x10 OTB 2x10 OTB 2x10 GTB         t-band extension  2x10 OTB 2x10 OTB 2x10 GTB         Wall slides  10x10" 10x10" 10x10"                                                                                                                                                            Modalities                                                         Assessment: Tolerated treatment well  Patient demonstrated fatigue post treatment and would benefit from continued PT  Pt had some increased fatigue with exercise this visit due to increased activity over weekend, but was able to complete all exercises with no increase in pain or sx  Pt able to progress TB resistance this session  Pt  1:1 with PTA for entirety  Plan: Continue per plan of care

## 2018-07-10 ENCOUNTER — APPOINTMENT (OUTPATIENT)
Dept: PHYSICAL THERAPY | Facility: REHABILITATION | Age: 48
End: 2018-07-10
Payer: COMMERCIAL

## 2018-07-11 ENCOUNTER — OFFICE VISIT (OUTPATIENT)
Dept: PHYSICAL THERAPY | Facility: REHABILITATION | Age: 48
End: 2018-07-11
Payer: COMMERCIAL

## 2018-07-11 DIAGNOSIS — M25.511 CHRONIC RIGHT SHOULDER PAIN: ICD-10-CM

## 2018-07-11 DIAGNOSIS — G89.29 CHRONIC RIGHT SHOULDER PAIN: ICD-10-CM

## 2018-07-11 DIAGNOSIS — M54.12 CERVICAL RADICULOPATHY: Primary | ICD-10-CM

## 2018-07-11 PROCEDURE — 97110 THERAPEUTIC EXERCISES: CPT | Performed by: PHYSICAL THERAPIST

## 2018-07-11 PROCEDURE — 97112 NEUROMUSCULAR REEDUCATION: CPT | Performed by: PHYSICAL THERAPIST

## 2018-07-11 PROCEDURE — 97140 MANUAL THERAPY 1/> REGIONS: CPT | Performed by: PHYSICAL THERAPIST

## 2018-07-11 NOTE — PROGRESS NOTES
Daily Note     Today's date: 2018  Patient name: Kell Ramirez  : 1970  MRN: [id number]  Referring provider: Jomar Whittaker MD  Dx:   Encounter Diagnosis     ICD-10-CM    1  Cervical radiculopathy M54 12    2  Chronic right shoulder pain M25 511     G89 29        Start Time: 5023  Stop Time: 0813  Total time in clinic (min): 39 minutes   1on1 with PT/PTA  Subjective: Pt reports feeling tight and sore from a combination of completing yard work over the weekend and PT session on   Objective: See treatment diary below  Manual  7/3 7/5 7/9  7/11               R shoulder PROM KP KP KP  MP               R arm radial nerve glides KP KP KP  TP                                                                                             Exercise Diary  6/25 7/3 7/5 7/9  7/11             UBE   3/3 3/3 3/3  3/3             scap retractions 10  10x10" 10x10" 10x10" 10x10"             Sleeper stretch 3x20" 3x20" 3x20" 3x30"  3x30"             Serratus punches   2x10 5" 2x10 5" 2x10 5" 2x10 5"             T's   2x10 2x10 2x10  2x10             BL ER   2x10 OTB 2x10 OTB 2x10 OTB  2x10 OTB             t-band rows   2x10 OTB 2x10 OTB 2x10 GTB  2x10 GTB             t-band extension   2x10 OTB 2x10 OTB 2x10 GTB  2x10 GTB             Wall slides   10x10" 10x10" 10x10"  10x10"                                                                                                                                                                                                                                                                                           Modalities                                                                                                      Assessment: Tolerated treatment well  Patient exhibited good technique with therapeutic exercises  Pt reports a lateral R shld muscular pain during bilat  ER with OTB  Pt states feeling more of a stretch anteriorly than usual with scap retractions   SPT performed posterior capsule stretch with relief of targeted area of tightness  Plan: Continue per plan of care

## 2018-07-12 ENCOUNTER — APPOINTMENT (OUTPATIENT)
Dept: PHYSICAL THERAPY | Facility: REHABILITATION | Age: 48
End: 2018-07-12
Payer: COMMERCIAL

## 2018-07-17 ENCOUNTER — OFFICE VISIT (OUTPATIENT)
Dept: PHYSICAL THERAPY | Facility: REHABILITATION | Age: 48
End: 2018-07-17
Payer: COMMERCIAL

## 2018-07-17 DIAGNOSIS — M54.12 CERVICAL RADICULOPATHY: Primary | ICD-10-CM

## 2018-07-17 DIAGNOSIS — G89.29 CHRONIC RIGHT SHOULDER PAIN: ICD-10-CM

## 2018-07-17 DIAGNOSIS — M25.511 CHRONIC RIGHT SHOULDER PAIN: ICD-10-CM

## 2018-07-17 PROCEDURE — 97140 MANUAL THERAPY 1/> REGIONS: CPT

## 2018-07-17 PROCEDURE — 97110 THERAPEUTIC EXERCISES: CPT

## 2018-07-17 PROCEDURE — 97112 NEUROMUSCULAR REEDUCATION: CPT

## 2018-07-17 NOTE — PROGRESS NOTES
Daily Note     Today's date: 2018  Patient name: Peg Joseph  : 1970  MRN: [id number]  Referring provider: Trevor Valenzuela MD  Dx:   Encounter Diagnosis     ICD-10-CM    1  Cervical radiculopathy M54 12    2  Chronic right shoulder pain M25 511     G89 29                   Subjective: Pt reports that he had some pain in R forearm this weekend while working on laptop, attributes it to extended sitting on sofa while using laptop  Pt reports shoulder still has some tightness, but has not had any more nerve pain, and feels he has increased ROM  Objective: See treatment diary below      Manual  7/3 7/5 7/9  7/11  7/17             R shoulder PROM KP KP KP  MP  KP             R arm radial nerve glides KP KP KP  TP  KP                                                                                           Exercise Diary  6/25 7/3 7/5 7/9  7/11  7/17           UBE   3/3 3/3 3/3  3/3  3/3           scap retractions 10  10x10" 10x10" 10x10" 10x10" 10x10"           Sleeper stretch 3x20" 3x20" 3x20" 3x30"  3x30"  3x30"           Serratus punches   2x10 5" 2x10 5" 2x10 5" 2x10 5"  2x15 5"           T's   2x10 2x10 2x10  2x10  2x15           BL ER   2x10 OTB 2x10 OTB 2x10 OTB  2x10 OTB  2x15 OTB           t-band rows   2x10 OTB 2x10 OTB 2x10 GTB  2x10 GTB  2x15 GTB           t-band extension   2x10 OTB 2x10 OTB 2x10 GTB  2x10 GTB  2x15 GTB           Wall slides   10x10" 10x10" 10x10"  10x10"  10x10"                                                                                                                                                                                                                                                                                         Modalities                                                                                                      Assessment: Tolerated treatment well  Patient would benefit from continued PT    Pt able to increase reps on all exercises without complaint this session  Pt  able to complete all exercises with no increase in pain during or after session  Pt  1:1 with PTA for entirety  Plan: Continue per plan of care

## 2018-07-19 ENCOUNTER — OFFICE VISIT (OUTPATIENT)
Dept: PHYSICAL THERAPY | Facility: REHABILITATION | Age: 48
End: 2018-07-19
Payer: COMMERCIAL

## 2018-07-19 DIAGNOSIS — M54.12 CERVICAL RADICULOPATHY: Primary | ICD-10-CM

## 2018-07-19 DIAGNOSIS — M25.511 CHRONIC RIGHT SHOULDER PAIN: ICD-10-CM

## 2018-07-19 DIAGNOSIS — G89.29 CHRONIC RIGHT SHOULDER PAIN: ICD-10-CM

## 2018-07-19 PROCEDURE — G8990 OTHER PT/OT CURRENT STATUS: HCPCS

## 2018-07-19 PROCEDURE — 97110 THERAPEUTIC EXERCISES: CPT

## 2018-07-19 PROCEDURE — G8991 OTHER PT/OT GOAL STATUS: HCPCS

## 2018-07-19 PROCEDURE — 97140 MANUAL THERAPY 1/> REGIONS: CPT

## 2018-07-19 PROCEDURE — 97112 NEUROMUSCULAR REEDUCATION: CPT

## 2018-07-19 NOTE — PROGRESS NOTES
Daily Note     Today's date: 2018  Patient name: Lawrence Wu  : 1970  MRN: [id number]  Referring provider: Sandra Eisenberg MD  Dx:   Encounter Diagnosis     ICD-10-CM    1  Cervical radiculopathy M54 12    2  Chronic right shoulder pain M25 511     G89 29                   Subjective: Pt notes he had some minor increase in soreness after LV, but felt improvement, and feels good upon arrival this visit  Objective: See treatment diary below      Manual  7/3 7/5 7/9  7/11  7/17  7/19           R shoulder PROM KP KP KP  MP  KP  KP           R arm radial nerve glides KP KP KP  TP  KP  KP                                                                                         Exercise Diary  6/25 7/3 7/5 7/9  7/11  7/17  7/19         UBE   3/3 3/3 3/3  3/3  3/3  3/3         scap retractions 10  10x10" 10x10" 10x10" 10x10" 10x10"  10x10"         Sleeper stretch 3x20" 3x20" 3x20" 3x30"  3x30"  3x30"  3x30"         Serratus punches   2x10 5" 2x10 5" 2x10 5" 2x10 5"  2x15 5"  2x15 5"         T's   2x10 2x10 2x10  2x10  2x15  2x15         BL ER   2x10 OTB 2x10 OTB 2x10 OTB  2x10 OTB  2x15 OTB  2x15 OTB         t-band rows   2x10 OTB 2x10 OTB 2x10 GTB  2x10 GTB  2x15 GTB  2x15 GTB         t-band extension   2x10 OTB 2x10 OTB 2x10 GTB  2x10 GTB  2x15 GTB  2x15 GTB         Wall slides   10x10" 10x10" 10x10"  10x10"  10x10"  10x10"                                                                                                                                                                                                                                                                                       Modalities                                                                                                      Assessment: Tolerated treatment well  Patient would benefit from continued PT  Pt showing improvement with PROM, did not have any clicking or cracking this session    Pt demonstrated less fatigue this session  Pt  able to complete all exercises with no increase in pain during or after session  Pt  1:1 with PTA for entirety  Plan: Continue per plan of care

## 2018-07-24 ENCOUNTER — OFFICE VISIT (OUTPATIENT)
Dept: PHYSICAL THERAPY | Facility: REHABILITATION | Age: 48
End: 2018-07-24
Payer: COMMERCIAL

## 2018-07-24 DIAGNOSIS — G89.29 CHRONIC RIGHT SHOULDER PAIN: ICD-10-CM

## 2018-07-24 DIAGNOSIS — M25.511 CHRONIC RIGHT SHOULDER PAIN: ICD-10-CM

## 2018-07-24 DIAGNOSIS — M54.12 CERVICAL RADICULOPATHY: Primary | ICD-10-CM

## 2018-07-24 PROCEDURE — 97112 NEUROMUSCULAR REEDUCATION: CPT | Performed by: PHYSICAL THERAPIST

## 2018-07-24 PROCEDURE — 97110 THERAPEUTIC EXERCISES: CPT | Performed by: PHYSICAL THERAPIST

## 2018-07-24 PROCEDURE — 97140 MANUAL THERAPY 1/> REGIONS: CPT | Performed by: PHYSICAL THERAPIST

## 2018-07-24 NOTE — PROGRESS NOTES
Daily Note     Today's date: 2018  Patient name: Eduardo Holcomb  : 1970  MRN: [id number]  Referring provider: Byron Kawasaki, MD  Dx:   Encounter Diagnosis     ICD-10-CM    1  Cervical radiculopathy M54 12    2  Chronic right shoulder pain M25 511     G89 29                   Subjective: Pt reports that he has some pain in his R deltoid but has not experienced nerve pain for weeks  Objective: See treatment diary below      Manual  7/3 7/5 7/9  7/11  7/17  7/19  7/24         R shoulder PROM KP KP KP  MP  KP  KP [facility]         R arm radial nerve glides KP KP KP  TP  KP  KP [facility]          parascapular trigger point release              ANIA                                                               Exercise Diary  6/25 7/3 7/5 7/9  7/11  7/17  7/19  7/24       UBE   3/3 3/3 3/3  3/3  3/3  3/3  33       scap retractions 10  10x10" 10x10" 10x10" 10x10" 10x10"  10x10"  dc       Sleeper stretch 3x20" 3x20" 3x20" 3x30"  3x30"  3x30"  3x30"  dc       Serratus punches   2x10 5" 2x10 5" 2x10 5" 2x10 5"  2x15 5"  2x15 5"  2x15 3#       T's   2x10 2x10 2x10  2x10  2x15  2x15  2x15 each       BL ER   2x10 OTB 2x10 OTB 2x10 OTB  2x10 OTB  2x15 OTB  2x15 OTB  2x15 OTB       t-band rows   2x10 OTB 2x10 OTB 2x10 GTB  2x10 GTB  2x15 GTB  2x15 GTB  2x15 GTB       t-band extension   2x10 OTB 2x10 OTB 2x10 GTB  2x10 GTB  2x15 GTB  2x15 GTB  2x15 GTB       Wall slides   10x10" 10x10" 10x10"  10x10"  10x10"  10x10"  2x10                                                                                                                                                                                                                                                                                     Modalities                                                                                                      Assessment: Tolerated treatment well  Patient would benefit from continued PT   Pt showed excellent rotation PROM this visit with mild limitation in FE that improved following scapular mobs  Pt able to perform TE without complaints of pain and with minimal cuing required  Plan: Continue per plan of care

## 2018-07-26 ENCOUNTER — APPOINTMENT (OUTPATIENT)
Dept: PHYSICAL THERAPY | Facility: REHABILITATION | Age: 48
End: 2018-07-26
Payer: COMMERCIAL

## 2018-07-31 ENCOUNTER — OFFICE VISIT (OUTPATIENT)
Dept: PHYSICAL THERAPY | Facility: REHABILITATION | Age: 48
End: 2018-07-31
Payer: COMMERCIAL

## 2018-07-31 DIAGNOSIS — G89.29 CHRONIC RIGHT SHOULDER PAIN: ICD-10-CM

## 2018-07-31 DIAGNOSIS — M54.12 CERVICAL RADICULOPATHY: Primary | ICD-10-CM

## 2018-07-31 DIAGNOSIS — M25.511 CHRONIC RIGHT SHOULDER PAIN: ICD-10-CM

## 2018-07-31 PROCEDURE — 97112 NEUROMUSCULAR REEDUCATION: CPT | Performed by: PHYSICAL THERAPIST

## 2018-07-31 PROCEDURE — 97140 MANUAL THERAPY 1/> REGIONS: CPT | Performed by: PHYSICAL THERAPIST

## 2018-07-31 PROCEDURE — 97110 THERAPEUTIC EXERCISES: CPT | Performed by: PHYSICAL THERAPIST

## 2018-07-31 NOTE — PROGRESS NOTES
Daily Note     Today's date: 2018  Patient name: Miladis Mack  : 1970  MRN: [id number]  Referring provider: Vidal Davies MD  Dx:   Encounter Diagnosis     ICD-10-CM    1  Cervical radiculopathy M54 12    2  Chronic right shoulder pain M25 511     G89 29                   Subjective: Pt reports that he pushed himself this weekend and had pain for 24 hours after but recovered fully and quickly  Objective: See treatment diary below      Manual  7/3 7/5 7/9  7/11  7/17  7/19  7/24  7/31       R shoulder PROM KP KP KP  MP  KP  KP [address]       R arm radial nerve glides KP KP KP  TP  KP  KP [address]        parascapular trigger point release             [address]  [place]                                                             Exercise Diary  6/25 7/3 7/5 7/9  7/11  7/17  7/19  7/24  7/31     UBE   3/3 3/3 3/3  3/3  3/3  3/3  3/3  3/3     Serratus punches   2x10 5" 2x10 5" 2x10 5" 2x10 5"  2x15 5"  2x15 5"  2x15 3# 2x15 3#     T's   2x10 2x10 2x10  2x10  2x15  2x15  2x15 each 2x15 each     BL ER   2x10 OTB 2x10 OTB 2x10 OTB  2x10 OTB  2x15 OTB  2x15 OTB  2x15 OTB       t-band rows   2x10 OTB 2x10 OTB 2x10 GTB  2x10 GTB  2x15 GTB  2x15 GTB  2x15 GTB 2x15 GTB     t-band extension   2x10 OTB 2x10 OTB 2x10 GTB  2x10 GTB  2x15 GTB  2x15 GTB  2x15 GTB 2x15 GTB     Wall slides   10x10" 10x10" 10x10"  10x10"  10x10"  10x10"  2x10  2x10                                                                                                                                                                                                                                                                                   Modalities                                                                                                      Assessment: Tolerated treatment well  Patient would benefit from continued PT  Pt shows good performance of TE with minimal cuing needed for form and no increase in pain       Plan: Potential discharge next visit

## 2018-08-02 ENCOUNTER — OFFICE VISIT (OUTPATIENT)
Dept: PHYSICAL THERAPY | Facility: REHABILITATION | Age: 48
End: 2018-08-02
Payer: COMMERCIAL

## 2018-08-02 DIAGNOSIS — M25.511 CHRONIC RIGHT SHOULDER PAIN: ICD-10-CM

## 2018-08-02 DIAGNOSIS — M54.12 CERVICAL RADICULOPATHY: Primary | ICD-10-CM

## 2018-08-02 DIAGNOSIS — G89.29 CHRONIC RIGHT SHOULDER PAIN: ICD-10-CM

## 2018-08-02 PROCEDURE — 97112 NEUROMUSCULAR REEDUCATION: CPT | Performed by: PHYSICAL THERAPIST

## 2018-08-02 PROCEDURE — 97110 THERAPEUTIC EXERCISES: CPT | Performed by: PHYSICAL THERAPIST

## 2018-08-02 PROCEDURE — G8991 OTHER PT/OT GOAL STATUS: HCPCS | Performed by: PHYSICAL THERAPIST

## 2018-08-02 PROCEDURE — G8992 OTHER PT/OT  D/C STATUS: HCPCS | Performed by: PHYSICAL THERAPIST

## 2018-08-02 PROCEDURE — 97140 MANUAL THERAPY 1/> REGIONS: CPT | Performed by: PHYSICAL THERAPIST

## 2018-08-02 NOTE — PROGRESS NOTES
Discharge Note     Today's date: 2018  Patient name: Nic Barlow  : 1970  MRN: [id number]  Referring provider: Gonzalo Freeman MD  Dx:   Encounter Diagnosis     ICD-10-CM    1  Cervical radiculopathy M54 12    2  Chronic right shoulder pain M25 511     G89 29                   Subjective: Pt reports that he continues to have some pain in his arms but states that he is confident in his HEP and would like to proceed with discharge from therapy  States his pain is much better then when he started and his shoulder pain has resolved         Objective: See treatment diary below      Manual  7/3 7/5 7/9  7/11  7/17  7/19  7/24  7/31       R shoulder PROM KP KP KP  MP  KP  KP [address]       R arm radial nerve glides KP KP KP  TP  KP  KP [address]        parascapular trigger point release             [address]  ANIA                                                             Exercise Diary  6/25 7/3 7/5 7/9  7/11  7/17  7/19  7/24  7/31  8/   UBE   3/3 33 3/3  3/3  3/3  3/3  3/3  3/3  3/3   Serratus punches   2x10 5" 2x10 5" 2x10 5" 2x10 5"  2x15 5"  2x15 5"  2x15 3# 2x15 3#  2x15 3#   T's   2x10 2x10 2x10  2x10  2x15  2x15  2x15 each 2x15 each  NP   BL ER   2x10 OTB 2x10 OTB 2x10 OTB  2x10 OTB  2x15 OTB  2x15 OTB  2x15 OTB   2x15 OTB   t-band rows   2x10 OTB 2x10 OTB 2x10 GTB  2x10 GTB  2x15 GTB  2x15 GTB  2x15 GTB 2x15 GTB 2x15 GTB   t-band extension   2x10 OTB 2x10 OTB 2x10 GTB  2x10 GTB  2x15 GTB  2x15 GTB  2x15 GTB 2x15 GTB 2x15 GTB   Wall slides   10x10" 10x10" 10x10"  10x10"  10x10"  10x10"  2x10  2x10 2x10                                                                                                                                                                                                                                                                                 Modalities                                                                                                      Assessment: Pt has met all goals for therapy and is independent in HEP  Short Term  1: Patient will report a 50% decrease in pain in 2-4 weeks  - MET (max 2)   2: Pt will have R shoulder ROM equal to the left in 4 weeks  - MET  3  Pt will have scapular strength 4+/5 bilaterally in 4 weeks  - MET  Long Term  1: Patient will demonstrate independence in home exercise program by discharge  - MET  2: Patient will have FOTO score of 65 indicating improved function in 8 - MET      Plan: Pt discharged from PT at this time

## 2018-09-18 DIAGNOSIS — F51.01 PRIMARY INSOMNIA: Primary | ICD-10-CM

## 2018-09-20 ENCOUNTER — TELEPHONE (OUTPATIENT)
Dept: INTERNAL MEDICINE CLINIC | Facility: CLINIC | Age: 48
End: 2018-09-20

## 2018-09-20 DIAGNOSIS — E78.5 HYPERLIPIDEMIA, UNSPECIFIED HYPERLIPIDEMIA TYPE: ICD-10-CM

## 2018-09-20 DIAGNOSIS — G47.00 INSOMNIA, UNSPECIFIED TYPE: Primary | ICD-10-CM

## 2018-09-20 PROBLEM — J01.90 ACUTE BACTERIAL SINUSITIS: Status: RESOLVED | Noted: 2018-06-20 | Resolved: 2018-09-20

## 2018-09-20 PROBLEM — B96.89 ACUTE BACTERIAL SINUSITIS: Status: RESOLVED | Noted: 2018-06-20 | Resolved: 2018-09-20

## 2018-09-20 RX ORDER — ZOLPIDEM TARTRATE 5 MG/1
5 TABLET ORAL
Qty: 30 TABLET | Refills: 0 | Status: SHIPPED | OUTPATIENT
Start: 2018-09-20 | End: 2019-04-30 | Stop reason: SDUPTHER

## 2018-09-20 RX ORDER — ATORVASTATIN CALCIUM 40 MG/1
40 TABLET, FILM COATED ORAL DAILY
Qty: 90 TABLET | Refills: 1 | Status: SHIPPED | OUTPATIENT
Start: 2018-09-20 | End: 2019-04-30 | Stop reason: SDUPTHER

## 2018-10-30 ENCOUNTER — OFFICE VISIT (OUTPATIENT)
Dept: INTERNAL MEDICINE CLINIC | Facility: CLINIC | Age: 48
End: 2018-10-30
Payer: COMMERCIAL

## 2018-10-30 VITALS
BODY MASS INDEX: 26.2 KG/M2 | OXYGEN SATURATION: 97 % | WEIGHT: 183 LBS | SYSTOLIC BLOOD PRESSURE: 120 MMHG | HEART RATE: 79 BPM | DIASTOLIC BLOOD PRESSURE: 57 MMHG | TEMPERATURE: 97.9 F | HEIGHT: 70 IN

## 2018-10-30 DIAGNOSIS — E55.9 VITAMIN D DEFICIENCY: ICD-10-CM

## 2018-10-30 DIAGNOSIS — Z23 FLU VACCINE NEED: ICD-10-CM

## 2018-10-30 DIAGNOSIS — M26.621 ARTHRALGIA OF RIGHT TEMPOROMANDIBULAR JOINT: ICD-10-CM

## 2018-10-30 DIAGNOSIS — M25.511 CHRONIC RIGHT SHOULDER PAIN: Primary | ICD-10-CM

## 2018-10-30 DIAGNOSIS — G89.29 CHRONIC RIGHT SHOULDER PAIN: Primary | ICD-10-CM

## 2018-10-30 DIAGNOSIS — E78.5 HYPERLIPIDEMIA, UNSPECIFIED HYPERLIPIDEMIA TYPE: ICD-10-CM

## 2018-10-30 PROBLEM — J33.9 NASAL POLYP: Status: RESOLVED | Noted: 2018-06-20 | Resolved: 2018-10-30

## 2018-10-30 PROCEDURE — 3008F BODY MASS INDEX DOCD: CPT | Performed by: INTERNAL MEDICINE

## 2018-10-30 PROCEDURE — 90471 IMMUNIZATION ADMIN: CPT

## 2018-10-30 PROCEDURE — 90686 IIV4 VACC NO PRSV 0.5 ML IM: CPT

## 2018-10-30 PROCEDURE — 99213 OFFICE O/P EST LOW 20 MIN: CPT | Performed by: INTERNAL MEDICINE

## 2018-10-30 NOTE — PROGRESS NOTES
Assessment/Plan:     Diagnoses and all orders for this visit:    Chronic right shoulder pain  -     Ambulatory referral to Orthopedic Surgery; Future  -Will have him see Dr Neyda Bermeo for an evaluation  He is unable to actively raise his arm past 90 degrees and has had pain on and off for over 1 year  Hyperlipidemia, unspecified hyperlipidemia type  -     Comprehensive metabolic panel  -     Lipid panel  -     TSH, 3rd generation with Free T4 reflex  -     CBC and differential  He has not taking the statin in some time and will complete blood work as it has been some time  Vitamin D deficiency  -     Vitamin D 25 hydroxy; Future    Arthralgia of right temporomandibular joint  -We will find out if ENT also manages this but if not, he may benefit from OMFS  Flu vaccine need  -     SYRINGE/SINGLE-DOSE VIAL: influenza vaccine, [id number], quadrivalent, 0 5 mL, preservative-free, for patients 3+ yr (FLUZONE)        Subjective:      Patient ID: Ryanne Cohn is a 51 y o  male  Herberth Alegria is here today for a follow up  He has been generally doing okay but has been bothered by a few things  After his continued sinus infection, he was put on antibiotics for 1 month via ENT and has had chronic tinnitus with loss of low frequency hearing  This has been bothersome for him  He also reports chronic right shoulder discomfort that comes and goes  He was seen Orthopedics that suggested that he first work with PT with the neck arthralgias (which has improved) but continues with the shoulder discomfort  He also reports TMJ on the right that is pretty bothersome where his jaw will lock  He also reports stressors in general at home surrounding finances that get him down at times           The following portions of the patient's history were reviewed and updated as appropriate: allergies, current medications, past family history, past medical history, past social history, past surgical history and problem list     Review of Systems   Constitutional: Positive for fatigue  Negative for chills and fever  Respiratory: Negative for cough, shortness of breath and wheezing  Cardiovascular: Negative for chest pain, palpitations and leg swelling  Gastrointestinal: Negative for abdominal pain, constipation, diarrhea, nausea and vomiting  Musculoskeletal: Positive for arthralgias  Allergic/Immunologic: Positive for environmental allergies  Psychiatric/Behavioral: Positive for dysphoric mood and sleep disturbance  The patient is not nervous/anxious  Objective:      /57 (BP Location: Left arm, Patient Position: Sitting, Cuff Size: Standard)   Pulse 79   Temp 97 9 °F (36 6 °C) (Tympanic)   Ht 5' 10" (1 778 m)   Wt 83 kg (183 lb)   SpO2 97%   BMI 26 26 kg/m²          Physical Exam   Constitutional: He is oriented to person, place, and time  He appears well-developed and well-nourished  No distress  HENT:   Head: Normocephalic and atraumatic  Mouth/Throat: Oropharynx is clear and moist    Eyes: Conjunctivae and EOM are normal  Right eye exhibits no discharge  Left eye exhibits no discharge  No scleral icterus  Neck: Normal range of motion  Cardiovascular: Normal rate, regular rhythm and normal heart sounds  No murmur heard  Pulmonary/Chest: Effort normal and breath sounds normal  No respiratory distress  He has no wheezes  Musculoskeletal: Normal range of motion  He exhibits no edema  Lymphadenopathy:     He has no cervical adenopathy  Neurological: He is alert and oriented to person, place, and time  Skin: Skin is warm and dry  He is not diaphoretic  No erythema  Psychiatric: He has a normal mood and affect  His speech is normal and behavior is normal  Judgment and thought content normal    Vitals reviewed

## 2018-11-02 ENCOUNTER — TELEPHONE (OUTPATIENT)
Dept: FAMILY MEDICINE CLINIC | Facility: CLINIC | Age: 48
End: 2018-11-02

## 2018-11-02 NOTE — TELEPHONE ENCOUNTER
Called [facility], they said yes they do treat TMJ but it could potentially be an out of pocket expensive unless they find it medically necessary to bill to their medical insurance!

## 2018-11-02 NOTE — TELEPHONE ENCOUNTER
----- Message from Irais Hernandez DO sent at 10/30/2018  5:25 PM EDT -----  Could you see if OMFS manages/treats TMJ?

## 2018-11-10 LAB
25(OH)D3 SERPL-MCNC: 17 NG/ML (ref 30–100)
ALBUMIN SERPL-MCNC: 4.1 G/DL (ref 3.6–5.1)
ALBUMIN/GLOB SERPL: 1.6 (CALC) (ref 1–2.5)
ALP SERPL-CCNC: 77 U/L (ref 40–115)
ALT SERPL-CCNC: 29 U/L (ref 9–46)
AST SERPL-CCNC: 21 U/L (ref 10–40)
BASOPHILS # BLD AUTO: 32 CELLS/UL (ref 0–200)
BASOPHILS NFR BLD AUTO: 0.5 %
BILIRUB SERPL-MCNC: 0.4 MG/DL (ref 0.2–1.2)
BUN SERPL-MCNC: 11 MG/DL (ref 7–25)
BUN/CREAT SERPL: NORMAL (CALC) (ref 6–22)
CALCIUM SERPL-MCNC: 9.1 MG/DL (ref 8.6–10.3)
CHLORIDE SERPL-SCNC: 102 MMOL/L (ref 98–110)
CHOLEST SERPL-MCNC: 253 MG/DL
CHOLEST/HDLC SERPL: 6.8 (CALC)
CO2 SERPL-SCNC: 26 MMOL/L (ref 20–32)
CREAT SERPL-MCNC: 0.96 MG/DL (ref 0.6–1.35)
EOSINOPHIL # BLD AUTO: 151 CELLS/UL (ref 15–500)
EOSINOPHIL NFR BLD AUTO: 2.4 %
ERYTHROCYTE [DISTWIDTH] IN BLOOD BY AUTOMATED COUNT: 13 % (ref 11–15)
GLOBULIN SER CALC-MCNC: 2.6 G/DL (CALC) (ref 1.9–3.7)
GLUCOSE SERPL-MCNC: 91 MG/DL (ref 65–99)
HCT VFR BLD AUTO: 42.5 % (ref 38.5–50)
HDLC SERPL-MCNC: 37 MG/DL
HGB BLD-MCNC: 14.8 G/DL (ref 13.2–17.1)
LDLC SERPL CALC-MCNC: 167 MG/DL (CALC)
LYMPHOCYTES # BLD AUTO: 1821 CELLS/UL (ref 850–3900)
LYMPHOCYTES NFR BLD AUTO: 28.9 %
MCH RBC QN AUTO: 30.5 PG (ref 27–33)
MCHC RBC AUTO-ENTMCNC: 34.8 G/DL (ref 32–36)
MCV RBC AUTO: 87.6 FL (ref 80–100)
MONOCYTES # BLD AUTO: 410 CELLS/UL (ref 200–950)
MONOCYTES NFR BLD AUTO: 6.5 %
NEUTROPHILS # BLD AUTO: 3887 CELLS/UL (ref 1500–7800)
NEUTROPHILS NFR BLD AUTO: 61.7 %
NONHDLC SERPL-MCNC: 216 MG/DL (CALC)
PLATELET # BLD AUTO: 203 THOUSAND/UL (ref 140–400)
PMV BLD REES-ECKER: 9.7 FL (ref 7.5–12.5)
POTASSIUM SERPL-SCNC: 4.6 MMOL/L (ref 3.5–5.3)
PROT SERPL-MCNC: 6.7 G/DL (ref 6.1–8.1)
RBC # BLD AUTO: 4.85 MILLION/UL (ref 4.2–5.8)
SL AMB EGFR AFRICAN AMERICAN: 107 ML/MIN/1.73M2
SL AMB EGFR NON AFRICAN AMERICAN: 92 ML/MIN/1.73M2
SODIUM SERPL-SCNC: 137 MMOL/L (ref 135–146)
TRIGL SERPL-MCNC: 316 MG/DL
TSH SERPL-ACNC: 2.51 MIU/L (ref 0.4–4.5)
WBC # BLD AUTO: 6.3 THOUSAND/UL (ref 3.8–10.8)

## 2018-11-13 DIAGNOSIS — E55.9 VITAMIN D DEFICIENCY: Primary | ICD-10-CM

## 2018-11-13 RX ORDER — ERGOCALCIFEROL 1.25 MG/1
50000 CAPSULE ORAL WEEKLY
Qty: 8 CAPSULE | Refills: 0 | Status: SHIPPED | OUTPATIENT
Start: 2018-11-13 | End: 2019-04-30 | Stop reason: ALTCHOICE

## 2019-04-09 ENCOUNTER — TELEPHONE (OUTPATIENT)
Dept: FAMILY MEDICINE CLINIC | Facility: CLINIC | Age: 49
End: 2019-04-09

## 2019-04-30 ENCOUNTER — OFFICE VISIT (OUTPATIENT)
Dept: FAMILY MEDICINE CLINIC | Facility: CLINIC | Age: 49
End: 2019-04-30
Payer: COMMERCIAL

## 2019-04-30 VITALS
WEIGHT: 187.6 LBS | RESPIRATION RATE: 18 BRPM | HEIGHT: 71 IN | HEART RATE: 82 BPM | BODY MASS INDEX: 26.26 KG/M2 | SYSTOLIC BLOOD PRESSURE: 116 MMHG | DIASTOLIC BLOOD PRESSURE: 80 MMHG | OXYGEN SATURATION: 97 %

## 2019-04-30 DIAGNOSIS — Z82.49 FAMILY HISTORY OF HEART DISEASE: ICD-10-CM

## 2019-04-30 DIAGNOSIS — E66.3 OVERWEIGHT (BMI 25.0-29.9): ICD-10-CM

## 2019-04-30 DIAGNOSIS — E78.5 HYPERLIPIDEMIA, UNSPECIFIED HYPERLIPIDEMIA TYPE: Primary | ICD-10-CM

## 2019-04-30 DIAGNOSIS — Z12.11 SCREENING FOR COLON CANCER: ICD-10-CM

## 2019-04-30 DIAGNOSIS — G47.00 INSOMNIA, UNSPECIFIED TYPE: ICD-10-CM

## 2019-04-30 PROCEDURE — 3008F BODY MASS INDEX DOCD: CPT | Performed by: INTERNAL MEDICINE

## 2019-04-30 PROCEDURE — 99214 OFFICE O/P EST MOD 30 MIN: CPT | Performed by: INTERNAL MEDICINE

## 2019-04-30 PROCEDURE — 1036F TOBACCO NON-USER: CPT | Performed by: INTERNAL MEDICINE

## 2019-04-30 RX ORDER — ZOLPIDEM TARTRATE 5 MG/1
5 TABLET ORAL
Qty: 30 TABLET | Refills: 2 | Status: SHIPPED | OUTPATIENT
Start: 2019-04-30

## 2019-04-30 RX ORDER — ATORVASTATIN CALCIUM 40 MG/1
40 TABLET, FILM COATED ORAL DAILY
Qty: 90 TABLET | Refills: 1 | Status: SHIPPED | OUTPATIENT
Start: 2019-04-30 | End: 2020-10-19 | Stop reason: SDUPTHER

## 2019-06-12 ENCOUNTER — HOSPITAL ENCOUNTER (OUTPATIENT)
Dept: NON INVASIVE DIAGNOSTICS | Facility: CLINIC | Age: 49
Discharge: HOME | End: 2019-06-12
Payer: COMMERCIAL

## 2019-06-12 DIAGNOSIS — E78.5 HYPERLIPIDEMIA, UNSPECIFIED HYPERLIPIDEMIA TYPE: ICD-10-CM

## 2019-06-12 DIAGNOSIS — Z82.49 FAMILY HISTORY OF HEART DISEASE: ICD-10-CM

## 2019-06-12 PROCEDURE — 93017 CV STRESS TEST TRACING ONLY: CPT

## 2019-06-12 PROCEDURE — 93018 CV STRESS TEST I&R ONLY: CPT | Performed by: INTERNAL MEDICINE

## 2019-06-12 PROCEDURE — 93016 CV STRESS TEST SUPVJ ONLY: CPT | Performed by: INTERNAL MEDICINE

## 2019-06-13 LAB
CHEST PAIN STATEMENT: NORMAL
MAX DIASTOLIC BP: 82 MMHG
MAX HEART RATE: 176 BPM
MAX PREDICTED HEART RATE: 170 BPM
MAX. SYSTOLIC BP: 160 MMHG
PROTOCOL NAME: NORMAL
REASON FOR TERMINATION: NORMAL
TARGET HR FORMULA: NORMAL
TEST INDICATION: NORMAL
TIME IN EXERCISE PHASE: NORMAL

## 2019-07-18 ENCOUNTER — OFFICE VISIT (OUTPATIENT)
Dept: CARDIOLOGY CLINIC | Facility: CLINIC | Age: 49
End: 2019-07-18
Payer: COMMERCIAL

## 2019-07-18 VITALS
HEART RATE: 72 BPM | HEIGHT: 71 IN | BODY MASS INDEX: 26.21 KG/M2 | SYSTOLIC BLOOD PRESSURE: 106 MMHG | WEIGHT: 187.2 LBS | DIASTOLIC BLOOD PRESSURE: 64 MMHG

## 2019-07-18 DIAGNOSIS — E78.00 HYPERCHOLESTEROLEMIA: Primary | ICD-10-CM

## 2019-07-18 PROCEDURE — 99203 OFFICE O/P NEW LOW 30 MIN: CPT | Performed by: INTERNAL MEDICINE

## 2019-07-18 PROCEDURE — 93000 ELECTROCARDIOGRAM COMPLETE: CPT | Performed by: INTERNAL MEDICINE

## 2019-07-18 NOTE — PROGRESS NOTES
Cardiology Follow Up    [facility]  1970  [id number]  [facility]  [address]  [address]  [phone]    1  Hypercholesterolemia  POCT ECG       Interval History:   Cardiology consultation  Pleasant 58-year-old male who has no previous cardiac history  He has history of longstanding dyslipidemia  He has been off and on a statins for several years  Mostly not been very compliant with his regimen  His last lipid profile last year revealed total cholesterol 253, HDL 37 triglyceride 370 and   His current taking atorvastatin 40 mg daily  Any appears to be more compliant with his regimen  Does have strong family history of premature coronary disease, his father had bypass surgery x5 at age 44, both grandparents  of myocardial infarctions in the 46s  The patient himself is nonsmoker  He tries to exercise regularly but does not always accomplish that  He is slightly overweight he admits to not always manage stress well  He is under lot of stress as his wife has serious medical problems  He does suffer from occasional asthma mostly related to allergies  Complains of intermittent episode of chest discomfort which is localized in the left upper sternal area and precordial area which is reproducible palpation, he was told in the past as probably musculoskeletal nature  He does not have any exertional symptoms, he did recently undergo stress test, personally reviewed, did 12 minutes of Galen protocol achieving target heart rate, there was no EKG changes consistent with ischemia    There were no symptoms    Patient Active Problem List   Diagnosis    Cervical radiculopathy    Cervical stenosis of spinal canal    Chronic right shoulder pain    Herniated nucleus pulposus, C4-5 left    Hypercholesterolemia    Insomnia    Seasonal allergies    Vitamin D deficiency    Acid reflux    Arthralgia of right temporomandibular joint    Overweight (BMI 25 0-29  9)    Family history of heart disease     Past Medical History:   Diagnosis Date    Anxiety      Social History     Socioeconomic History    Marital status: /Civil Union     Spouse name: Not on file    Number of children: Not on file    Years of education: Not on file    Highest education level: Not on file   Occupational History    Not on file   Social Needs    Financial resource strain: Not on file    Food insecurity:     Worry: Not on file     Inability: Not on file    Transportation needs:     Medical: Not on file     Non-medical: Not on file   Tobacco Use    Smoking status: Never Smoker    Smokeless tobacco: Never Used   Substance and Sexual Activity    Alcohol use: Yes     Comment: Social    Drug use: No    Sexual activity: Not on file   Lifestyle    Physical activity:     Days per week: Not on file     Minutes per session: Not on file    Stress: Not on file   Relationships    Social connections:     Talks on phone: Not on file     Gets together: Not on file     Attends Islam service: Not on file     Active member of club or organization: Not on file     Attends meetings of clubs or organizations: Not on file     Relationship status: Not on file    Intimate partner violence:     Fear of current or ex partner: Not on file     Emotionally abused: Not on file     Physically abused: Not on file     Forced sexual activity: Not on file   Other Topics Concern    Not on file   Social History Narrative    1 cup of coffee/day      Family History   Problem Relation Age of Onset    Cancer Mother     Other Father         Premature coronary heart disease    Other Family         Premature Coronary Heart Disease    Other Family         Multiple Allergies     Past Surgical History:   Procedure Laterality Date    VASECTOMY      WISDOM TOOTH EXTRACTION         Current Outpatient Medications:     atorvastatin (LIPITOR) 40 mg tablet, Take 1 tablet (40 mg total) by mouth daily, Disp: 90 tablet, Rfl: 1    zolpidem (AMBIEN) 5 mg tablet, Take 1 tablet (5 mg total) by mouth daily at bedtime as needed for sleep, Disp: 30 tablet, Rfl: 2    albuterol (PROVENTIL HFA,VENTOLIN HFA) 90 mcg/act inhaler, Inhale 2 puffs every 6 (six) hours as needed, Disp: , Rfl:     ALPRAZolam (XANAX) 1 mg tablet, Take 1 tablet (1 mg total) by mouth daily at bedtime as needed for anxiety (Patient not taking: Reported on 7/18/2019), Disp: 30 tablet, Rfl: 1    azelastine (ASTELIN) 0 1 % nasal spray, 1 spray into each nostril 2 (two) times a day Use in each nostril as directed, Disp: , Rfl:     fluticasone (FLOVENT HFA) 110 MCG/ACT inhaler, Inhale 2 puffs 2 (two) times a day Rinse mouth after use , Disp: , Rfl:     loratadine (CLARITIN) 10 mg tablet, Take 10 mg by mouth daily, Disp: , Rfl:   No Known Allergies    Labs:  Hospital Outpatient Visit on 06/12/2019   Component Date Value    Protocol Name 06/12/2019 LYNDA     Time In Exercise Phase 06/12/2019 00:12:01     MAX  SYSTOLIC BP 41/75/4863 120     Max Diastolic Bp 00/06/7305 82     Max Heart Rate 06/12/2019 176     Max Predicted Heart Rate 06/12/2019 170     Reason for Termination 06/12/2019 Fatigue     Test Indication 06/12/2019 high cholesterol     Target Hr Formular 06/12/2019 (220 - Age)*100%     Chest Pain Statement 06/12/2019 none      Imaging: No results found  Review of Systems:  Review of Systems   Constitutional: Negative for activity change, fatigue and unexpected weight change  HENT: Positive for congestion  Eyes: Negative for visual disturbance  Respiratory: Positive for chest tightness and wheezing  Negative for apnea, cough, choking, shortness of breath and stridor  Cardiovascular: Negative for chest pain, palpitations and leg swelling  Gastrointestinal: Negative for abdominal pain and blood in stool  Endocrine: Negative for cold intolerance and heat intolerance  Genitourinary: Negative for difficulty urinating and hematuria  Musculoskeletal: Negative for arthralgias, gait problem and myalgias  Skin: Negative for pallor and rash  Allergic/Immunologic: Negative for immunocompromised state  Neurological: Negative for dizziness, syncope, speech difficulty and weakness  Hematological: Does not bruise/bleed easily  Psychiatric/Behavioral: Negative for sleep disturbance  The patient is nervous/anxious  Physical Exam:  Physical Exam   Constitutional: He appears well-developed and well-nourished  Non-toxic appearance  He does not appear ill  No distress  Neck: No JVD present  Cardiovascular: Normal rate, regular rhythm, normal heart sounds and intact distal pulses  No extrasystoles are present  Exam reveals no gallop, no friction rub and no decreased pulses  No murmur heard  Pulmonary/Chest: Effort normal and breath sounds normal  No accessory muscle usage or stridor  No apnea, no tachypnea and no bradypnea  No respiratory distress  He exhibits tenderness  Abdominal: Soft  Musculoskeletal:        Right lower leg: He exhibits no edema  Left lower leg: He exhibits no edema  Neurological: He is alert  Skin: Skin is warm and dry  Capillary refill takes less than 2 seconds  Psychiatric: He has a normal mood and affect  His behavior is normal        Discussion/Summary:  Dyslipidemia, chronic statin therapy  He is due for a lipid profile on his current regimen, will make adjustments accordingly  No vascular disease on clinical examination  Will do a coronary calcium score to assess for subclinical atherosclerosis dictate aggressiveness of his lipid lowering therapy favor no aspirin therapy at the present time  Continue regular exercise, weight management and stress management as well  Further recommendations pending results of the testing  This note was completed in part utilizing m-[employer] fluency direct voice recognition software  Grammatical errors, random word insertion, spelling mistakes, and incomplete sentences may be an occasional consequence of the system secondary to software limitations, ambient noise and hardware issues  At the time of dictation, efforts were made to edit, clarify and /or correct errors  Please read the chart carefully and recognize, using context, where substitutions have occurred  If you have any questions or concerns about the context, text or information contained within the body of this dictation, please contact myself, the provider, for further clarification

## 2019-08-05 ENCOUNTER — HOSPITAL ENCOUNTER (OUTPATIENT)
Dept: NON INVASIVE DIAGNOSTICS | Facility: CLINIC | Age: 49
Discharge: HOME | End: 2019-08-05
Payer: COMMERCIAL

## 2019-08-05 DIAGNOSIS — E78.00 HYPERCHOLESTEROLEMIA: ICD-10-CM

## 2019-08-05 PROCEDURE — 93306 TTE W/DOPPLER COMPLETE: CPT

## 2019-08-05 PROCEDURE — 93306 TTE W/DOPPLER COMPLETE: CPT | Performed by: INTERNAL MEDICINE

## 2019-09-24 ENCOUNTER — OFFICE VISIT (OUTPATIENT)
Dept: FAMILY MEDICINE CLINIC | Facility: CLINIC | Age: 49
End: 2019-09-24
Payer: COMMERCIAL

## 2019-09-24 VITALS
DIASTOLIC BLOOD PRESSURE: 70 MMHG | BODY MASS INDEX: 25.84 KG/M2 | WEIGHT: 184.6 LBS | TEMPERATURE: 98.2 F | OXYGEN SATURATION: 97 % | HEIGHT: 71 IN | HEART RATE: 88 BPM | SYSTOLIC BLOOD PRESSURE: 112 MMHG | RESPIRATION RATE: 18 BRPM

## 2019-09-24 DIAGNOSIS — T30.0 THERMAL BURN: Primary | ICD-10-CM

## 2019-09-24 PROCEDURE — 99213 OFFICE O/P EST LOW 20 MIN: CPT | Performed by: INTERNAL MEDICINE

## 2019-09-25 NOTE — PROGRESS NOTES
Assessment/Plan:       Diagnoses and all orders for this visit:    Thermal burn      Discussed to keep area covered and monitor it  Watch for signs of erythema/infection and will keep us updated on progress  Subjective:      Patient ID: Edie Mills is a 47 y o  male  Epperson Kera is here today for a thermal burn that occurred on Saturday  Reports he was working with a water heater and grabbed a scaling hot part  Reports obvious immediate pain  Since then, he has formed a blister that seems now steady in size  Pain is not significant but there are overlying skin changes  Does not note drainage or surrounding erythema  The following portions of the patient's history were reviewed and updated as appropriate: allergies, current medications, past family history, past medical history, past social history, past surgical history and problem list     Review of Systems   Constitutional: Negative for chills, fatigue and fever  Skin: Positive for wound  Negative for rash  Objective:      /70   Pulse 88   Temp 98 2 °F (36 8 °C)   Resp 18   Ht 5' 11" (1 803 m)   Wt 83 7 kg (184 lb 9 6 oz)   SpO2 97%   BMI 25 75 kg/m²          Physical Exam   Constitutional: He is oriented to person, place, and time  He appears well-developed and well-nourished  No distress  Cardiovascular: Normal rate  Pulmonary/Chest: Effort normal  No respiratory distress  Musculoskeletal: Normal range of motion  Neurological: He is alert and oriented to person, place, and time  Skin: He is not diaphoretic  Psychiatric: He has a normal mood and affect   His behavior is normal  Judgment and thought content normal

## 2019-09-27 ENCOUNTER — TELEPHONE (OUTPATIENT)
Dept: FAMILY MEDICINE CLINIC | Facility: CLINIC | Age: 49
End: 2019-09-27

## 2019-09-27 NOTE — TELEPHONE ENCOUNTER
Pt wanted to let you know that his hand blistered up and the blisters just popped now its healing just fine

## 2019-10-14 ENCOUNTER — APPOINTMENT (OUTPATIENT)
Dept: LAB | Facility: CLINIC | Age: 49
End: 2019-10-14
Payer: COMMERCIAL

## 2019-10-14 ENCOUNTER — OFFICE VISIT (OUTPATIENT)
Dept: FAMILY MEDICINE CLINIC | Facility: CLINIC | Age: 49
End: 2019-10-14
Payer: COMMERCIAL

## 2019-10-14 ENCOUNTER — TELEPHONE (OUTPATIENT)
Dept: FAMILY MEDICINE CLINIC | Facility: CLINIC | Age: 49
End: 2019-10-14

## 2019-10-14 VITALS
WEIGHT: 184.5 LBS | OXYGEN SATURATION: 96 % | HEIGHT: 71 IN | TEMPERATURE: 97.8 F | BODY MASS INDEX: 25.83 KG/M2 | SYSTOLIC BLOOD PRESSURE: 110 MMHG | HEART RATE: 86 BPM | DIASTOLIC BLOOD PRESSURE: 80 MMHG

## 2019-10-14 DIAGNOSIS — R60.0 LOWER EXTREMITY EDEMA: Primary | ICD-10-CM

## 2019-10-14 DIAGNOSIS — E55.9 VITAMIN D DEFICIENCY: ICD-10-CM

## 2019-10-14 DIAGNOSIS — R60.0 LOWER EXTREMITY EDEMA: ICD-10-CM

## 2019-10-14 LAB
25(OH)D3 SERPL-MCNC: 23 NG/ML (ref 30–100)
ALBUMIN SERPL BCP-MCNC: 3.5 G/DL (ref 3.5–5)
ALP SERPL-CCNC: 105 U/L (ref 46–116)
ALT SERPL W P-5'-P-CCNC: 76 U/L (ref 12–78)
ANION GAP SERPL CALCULATED.3IONS-SCNC: 4 MMOL/L (ref 4–13)
AST SERPL W P-5'-P-CCNC: 29 U/L (ref 5–45)
BASOPHILS # BLD AUTO: 0.05 THOUSANDS/ΜL (ref 0–0.1)
BASOPHILS NFR BLD AUTO: 1 % (ref 0–1)
BILIRUB SERPL-MCNC: 0.53 MG/DL (ref 0.2–1)
BUN SERPL-MCNC: 16 MG/DL (ref 5–25)
CALCIUM SERPL-MCNC: 9.1 MG/DL (ref 8.3–10.1)
CHLORIDE SERPL-SCNC: 105 MMOL/L (ref 100–108)
CHOLEST SERPL-MCNC: 152 MG/DL (ref 50–200)
CO2 SERPL-SCNC: 28 MMOL/L (ref 21–32)
CREAT SERPL-MCNC: 0.93 MG/DL (ref 0.6–1.3)
CRP SERPL QL: 34.6 MG/L
EOSINOPHIL # BLD AUTO: 0.12 THOUSAND/ΜL (ref 0–0.61)
EOSINOPHIL NFR BLD AUTO: 2 % (ref 0–6)
ERYTHROCYTE [DISTWIDTH] IN BLOOD BY AUTOMATED COUNT: 12.3 % (ref 11.6–15.1)
GFR SERPL CREATININE-BSD FRML MDRD: 95 ML/MIN/1.73SQ M
GLUCOSE P FAST SERPL-MCNC: 72 MG/DL (ref 65–99)
HCT VFR BLD AUTO: 41.9 % (ref 36.5–49.3)
HDLC SERPL-MCNC: 38 MG/DL (ref 40–60)
HGB BLD-MCNC: 13.7 G/DL (ref 12–17)
IMM GRANULOCYTES # BLD AUTO: 0.02 THOUSAND/UL (ref 0–0.2)
IMM GRANULOCYTES NFR BLD AUTO: 0 % (ref 0–2)
LDLC SERPL CALC-MCNC: 92 MG/DL (ref 0–100)
LYMPHOCYTES # BLD AUTO: 1.27 THOUSANDS/ΜL (ref 0.6–4.47)
LYMPHOCYTES NFR BLD AUTO: 16 % (ref 14–44)
MCH RBC QN AUTO: 29.5 PG (ref 26.8–34.3)
MCHC RBC AUTO-ENTMCNC: 32.7 G/DL (ref 31.4–37.4)
MCV RBC AUTO: 90 FL (ref 82–98)
MONOCYTES # BLD AUTO: 0.64 THOUSAND/ΜL (ref 0.17–1.22)
MONOCYTES NFR BLD AUTO: 8 % (ref 4–12)
NEUTROPHILS # BLD AUTO: 5.91 THOUSANDS/ΜL (ref 1.85–7.62)
NEUTS SEG NFR BLD AUTO: 73 % (ref 43–75)
NONHDLC SERPL-MCNC: 114 MG/DL
NRBC BLD AUTO-RTO: 0 /100 WBCS
PLATELET # BLD AUTO: 287 THOUSANDS/UL (ref 149–390)
PMV BLD AUTO: 9.9 FL (ref 8.9–12.7)
POTASSIUM SERPL-SCNC: 4.3 MMOL/L (ref 3.5–5.3)
PROT SERPL-MCNC: 7.6 G/DL (ref 6.4–8.2)
RBC # BLD AUTO: 4.65 MILLION/UL (ref 3.88–5.62)
SODIUM SERPL-SCNC: 137 MMOL/L (ref 136–145)
TRIGL SERPL-MCNC: 109 MG/DL
TSH SERPL DL<=0.05 MIU/L-ACNC: 2.02 UIU/ML (ref 0.36–3.74)
WBC # BLD AUTO: 8.01 THOUSAND/UL (ref 4.31–10.16)

## 2019-10-14 PROCEDURE — 84443 ASSAY THYROID STIM HORMONE: CPT | Performed by: INTERNAL MEDICINE

## 2019-10-14 PROCEDURE — 80053 COMPREHEN METABOLIC PANEL: CPT | Performed by: INTERNAL MEDICINE

## 2019-10-14 PROCEDURE — 80061 LIPID PANEL: CPT | Performed by: INTERNAL MEDICINE

## 2019-10-14 PROCEDURE — 85025 COMPLETE CBC W/AUTO DIFF WBC: CPT | Performed by: INTERNAL MEDICINE

## 2019-10-14 PROCEDURE — 86140 C-REACTIVE PROTEIN: CPT

## 2019-10-14 PROCEDURE — 86618 LYME DISEASE ANTIBODY: CPT

## 2019-10-14 PROCEDURE — 99213 OFFICE O/P EST LOW 20 MIN: CPT | Performed by: INTERNAL MEDICINE

## 2019-10-14 PROCEDURE — 36415 COLL VENOUS BLD VENIPUNCTURE: CPT

## 2019-10-14 PROCEDURE — 82306 VITAMIN D 25 HYDROXY: CPT

## 2019-10-14 RX ORDER — FUROSEMIDE 20 MG/1
20 TABLET ORAL DAILY
Qty: 3 TABLET | Refills: 0 | Status: SHIPPED | OUTPATIENT
Start: 2019-10-14 | End: 2019-10-21 | Stop reason: CLARIF

## 2019-10-14 NOTE — PROGRESS NOTES
Assessment/Plan:     Diagnoses and all orders for this visit:    Lower extremity edema  -     Cancel: Comprehensive metabolic panel  -     Lyme Antibody Profile with reflex to WB; Future  -     C-reactive protein; Future     Dejuan Brice was seen and examined in the office today  We discussed different causes of this but it is less likely to be something like a DVT or gout given the bilateral nature  He has had some improvement with elevation  He may benefit from a diuretic for a few days but I would like him to complete blood work first  No other changes were made  Subjective:      Patient ID: Jam Gurrola is a 47 y o  male  Dejuan Brice is here today with generalized arthralgias he noted  He reports about 2 weeks ago, he was working on the car  He reports getting up and down frequently  He was also using his feet on a pedal recently  He started noticing feet swelling and aches  He reports trying to elevate them and it seemed to improve  At times he feels aches somewhat everywhere  Denies fever  Reports noticing thigh and knee pain as well  Reports having a tic a few weeks ago but did not see associated rash  The following portions of the patient's history were reviewed and updated as appropriate: allergies, current medications, past family history, past medical history, past social history, past surgical history and problem list     Review of Systems   Constitutional: Negative for chills, fatigue and fever  Respiratory: Negative for cough, chest tightness and shortness of breath  Cardiovascular: Positive for leg swelling  Negative for chest pain  Gastrointestinal: Negative for abdominal pain, nausea and vomiting  Musculoskeletal: Positive for arthralgias, joint swelling and myalgias     Skin:        Reports having a tic noted some weeks          Objective:      /80 (BP Location: Left arm, Patient Position: Sitting, Cuff Size: Standard)   Pulse 86   Temp 97 8 °F (36 6 °C)   Ht 5' 11" (1 803 m)   [employer] 83 7 kg (184 lb 8 oz)   SpO2 96%   BMI 25 73 kg/m²          Physical Exam   Constitutional: He is oriented to person, place, and time  He appears well-developed and well-nourished  No distress  HENT:   Head: Normocephalic and atraumatic  Eyes: Conjunctivae and EOM are normal  Right eye exhibits no discharge  Left eye exhibits no discharge  No scleral icterus  Neck: Normal range of motion  Cardiovascular: Normal rate, regular rhythm and normal heart sounds  No murmur heard  LE edema noted bilaterally, slightly worse on the left and more prominent in the ankle    Pulmonary/Chest: Effort normal and breath sounds normal  No respiratory distress  He has no wheezes  Musculoskeletal: Normal range of motion  Neurological: He is alert and oriented to person, place, and time  Skin: Skin is warm and dry  He is not diaphoretic  No erythema  Psychiatric: He has a normal mood and affect  His speech is normal and behavior is normal  Judgment and thought content normal    Vitals reviewed

## 2019-10-14 NOTE — TELEPHONE ENCOUNTER
----- Message from Georgina Hagen, DO sent at 10/14/2019  4:27 PM EDT -----  Few things  Vit d is still low so make sure taking 4000 IU daily  His inflammatory marker is up which I suspected but is somewhat non-specific  I am going to trend this so would do a crp in 1-2 weeks (did not put in order)  Will send in lasix for just 3 days to take   Please let me know if anything is different in between

## 2019-10-15 LAB — B BURGDOR IGG+IGM SER-ACNC: <0.91 ISR (ref 0–0.9)

## 2019-10-16 NOTE — TELEPHONE ENCOUNTER
Notes recorded by Sukhdev Rodriguez RN on 10/16/2019 at 10:52 AM EDT  Notified   ------    Notes recorded by Ady Beyer DO on 10/15/2019 at 4:29 PM EDT  Lyme testing was negative

## 2019-10-21 ENCOUNTER — OFFICE VISIT (OUTPATIENT)
Dept: FAMILY MEDICINE CLINIC | Facility: CLINIC | Age: 49
End: 2019-10-21
Payer: COMMERCIAL

## 2019-10-21 VITALS
TEMPERATURE: 98.7 F | OXYGEN SATURATION: 98 % | WEIGHT: 185.25 LBS | HEART RATE: 78 BPM | DIASTOLIC BLOOD PRESSURE: 70 MMHG | HEIGHT: 71 IN | BODY MASS INDEX: 25.94 KG/M2 | SYSTOLIC BLOOD PRESSURE: 122 MMHG

## 2019-10-21 DIAGNOSIS — M25.50 POLYARTHRALGIA: ICD-10-CM

## 2019-10-21 DIAGNOSIS — R79.82 ELEVATED C-REACTIVE PROTEIN (CRP): Primary | ICD-10-CM

## 2019-10-21 PROCEDURE — 93000 ELECTROCARDIOGRAM COMPLETE: CPT | Performed by: INTERNAL MEDICINE

## 2019-10-21 PROCEDURE — 99213 OFFICE O/P EST LOW 20 MIN: CPT | Performed by: INTERNAL MEDICINE

## 2019-10-21 RX ORDER — PREDNISONE 20 MG/1
20 TABLET ORAL 2 TIMES DAILY WITH MEALS
Qty: 30 TABLET | Refills: 1 | Status: SHIPPED | OUTPATIENT
Start: 2019-10-21 | End: 2019-10-29

## 2019-10-21 NOTE — PROGRESS NOTES
Assessment/Plan:     Diagnoses and all orders for this visit:    Elevated C-reactive protein (CRP)  -     Sedimentation rate, automated; Future  -     CK (with reflex to MB); Future  -     UA (URINE) with reflex to Scope  -     predniSONE 20 mg tablet; Take 1 tablet (20 mg total) by mouth 2 (two) times a day with meals  -     Ambulatory referral to Rheumatology; Future    Polyarthralgia  -     predniSONE 20 mg tablet; Take 1 tablet (20 mg total) by mouth 2 (two) times a day with meals  -     Ambulatory referral to Rheumatology; Future     Dayanara Lott was seen and examined in the office today  Given his presentation and symptoms, we discussed possible PMR vs other process  I am going to have him start prednisone but I did want him to follow up with Rheumatology  Will be seeing Rheum tomorrow afternoon  Subjective:      Patient ID: Bessy Nelson is a 47 y o  male  Dayanara Lott is here today with complaints of not feeling well  He continues to note LE edema but it seems to worsen if he is on his feet more  He reports that arthralgias he was feeling mostly in the feet seems to have lessened but feels it all over now in the joints  He feels chills at times  He has not noted other joint swelling but has noted arthralgias  Some days he feels better but other days he feel like he cannot arise from a chair  He has not particularly noted difficulty going up the stairs or arm weakness  No fevers or recent URI symptoms  The following portions of the patient's history were reviewed and updated as appropriate: allergies, current medications, past family history, past medical history, past social history, past surgical history and problem list     Review of Systems   Constitutional: Positive for chills  Negative for fatigue and fever  Musculoskeletal: Positive for arthralgias and joint swelling  Skin: Negative for rash  Neurological: Negative for numbness and headaches           Objective:      /70 (BP Location: Left arm, Patient Position: Sitting, Cuff Size: Standard)   Pulse 78   Temp 98 7 °F (37 1 °C)   Ht 5' 11" (1 803 m)   Wt 84 kg (185 lb 4 oz)   SpO2 98%   BMI 25 84 kg/m²          Physical Exam   Constitutional: He is oriented to person, place, and time  He appears well-developed and well-nourished  No distress  HENT:   Head: Normocephalic and atraumatic  Eyes: Conjunctivae and EOM are normal  Right eye exhibits no discharge  Left eye exhibits no discharge  No scleral icterus  Neck: Normal range of motion  Cardiovascular: Normal rate, regular rhythm and normal heart sounds  No murmur heard  LE edema noted in the ankles    Pulmonary/Chest: Effort normal and breath sounds normal  No respiratory distress  He has no wheezes  Abdominal: Soft  Musculoskeletal:   Feels discomfort getting up from the chair    Lymphadenopathy:     He has no cervical adenopathy  Neurological: He is alert and oriented to person, place, and time  Skin: Skin is warm and dry  He is not diaphoretic  No erythema  Psychiatric: He has a normal mood and affect  His speech is normal and behavior is normal  Judgment and thought content normal    Vitals reviewed

## 2019-10-22 ENCOUNTER — OFFICE VISIT (OUTPATIENT)
Dept: RHEUMATOLOGY | Facility: CLINIC | Age: 49
End: 2019-10-22
Payer: COMMERCIAL

## 2019-10-22 ENCOUNTER — TELEPHONE (OUTPATIENT)
Dept: FAMILY MEDICINE CLINIC | Facility: CLINIC | Age: 49
End: 2019-10-22

## 2019-10-22 ENCOUNTER — APPOINTMENT (OUTPATIENT)
Dept: LAB | Facility: MEDICAL CENTER | Age: 49
End: 2019-10-22
Payer: COMMERCIAL

## 2019-10-22 VITALS
RESPIRATION RATE: 18 BRPM | HEIGHT: 70 IN | DIASTOLIC BLOOD PRESSURE: 92 MMHG | SYSTOLIC BLOOD PRESSURE: 148 MMHG | BODY MASS INDEX: 26.48 KG/M2 | HEART RATE: 82 BPM | WEIGHT: 185 LBS

## 2019-10-22 DIAGNOSIS — M02.30 REACTIVE ARTHRITIS (HCC): ICD-10-CM

## 2019-10-22 DIAGNOSIS — R79.82 ELEVATED C-REACTIVE PROTEIN (CRP): ICD-10-CM

## 2019-10-22 DIAGNOSIS — R60.0 LOWER EXTREMITY EDEMA: ICD-10-CM

## 2019-10-22 DIAGNOSIS — M19.90 INFLAMMATORY ARTHRITIS: ICD-10-CM

## 2019-10-22 DIAGNOSIS — M25.50 POLYARTHRALGIA: Primary | ICD-10-CM

## 2019-10-22 LAB
BACTERIA UR QL AUTO: NORMAL /HPF
BILIRUB UR QL STRIP: NEGATIVE
CK SERPL-CCNC: 70 U/L (ref 39–308)
CLARITY UR: CLEAR
COLOR UR: ABNORMAL
CRP SERPL QL: 41.5 MG/L
ERYTHROCYTE [SEDIMENTATION RATE] IN BLOOD: 54 MM/HOUR (ref 0–10)
GLUCOSE UR STRIP-MCNC: NEGATIVE MG/DL
HGB UR QL STRIP.AUTO: NEGATIVE
HYALINE CASTS #/AREA URNS LPF: NORMAL /LPF
KETONES UR STRIP-MCNC: NEGATIVE MG/DL
LDH SERPL-CCNC: 184 U/L (ref 81–234)
LEUKOCYTE ESTERASE UR QL STRIP: NEGATIVE
NITRITE UR QL STRIP: NEGATIVE
NON-SQ EPI CELLS URNS QL MICRO: NORMAL /HPF
PH UR STRIP.AUTO: 5.5 [PH]
PROT UR STRIP-MCNC: ABNORMAL MG/DL
RBC #/AREA URNS AUTO: NORMAL /HPF
SP GR UR STRIP.AUTO: 1.03 (ref 1–1.03)
URATE SERPL-MCNC: 4.6 MG/DL (ref 4.2–8)
UROBILINOGEN UR QL STRIP.AUTO: 0.2 E.U./DL
WBC #/AREA URNS AUTO: NORMAL /HPF

## 2019-10-22 PROCEDURE — 83615 LACTATE (LD) (LDH) ENZYME: CPT | Performed by: INTERNAL MEDICINE

## 2019-10-22 PROCEDURE — 36415 COLL VENOUS BLD VENIPUNCTURE: CPT | Performed by: INTERNAL MEDICINE

## 2019-10-22 PROCEDURE — 99244 OFF/OP CNSLTJ NEW/EST MOD 40: CPT | Performed by: INTERNAL MEDICINE

## 2019-10-22 PROCEDURE — 86140 C-REACTIVE PROTEIN: CPT

## 2019-10-22 PROCEDURE — 86200 CCP ANTIBODY: CPT | Performed by: INTERNAL MEDICINE

## 2019-10-22 PROCEDURE — 85652 RBC SED RATE AUTOMATED: CPT

## 2019-10-22 PROCEDURE — 82550 ASSAY OF CK (CPK): CPT

## 2019-10-22 PROCEDURE — 82085 ASSAY OF ALDOLASE: CPT | Performed by: INTERNAL MEDICINE

## 2019-10-22 PROCEDURE — 81001 URINALYSIS AUTO W/SCOPE: CPT | Performed by: INTERNAL MEDICINE

## 2019-10-22 PROCEDURE — 86430 RHEUMATOID FACTOR TEST QUAL: CPT | Performed by: INTERNAL MEDICINE

## 2019-10-22 PROCEDURE — 84550 ASSAY OF BLOOD/URIC ACID: CPT | Performed by: INTERNAL MEDICINE

## 2019-10-22 RX ORDER — NAPROXEN 500 MG/1
500 TABLET ORAL 2 TIMES DAILY WITH MEALS
Qty: 60 TABLET | Refills: 3 | Status: SHIPPED | OUTPATIENT
Start: 2019-10-22 | End: 2019-12-12 | Stop reason: SDUPTHER

## 2019-10-22 NOTE — PROGRESS NOTES
Assessment and Plan: Gt Weiss is a 47 y o  male who presents as a Rheumatology consult referred by Madalyn Hernandez DO for evaluation of possible polymyalgia rheumatica  Patient's mainly peripheral arthritis symptoms are actually inconsistent with polymyalgia rheumatica, which is also a diagnosis that is more common in the elderly population  However, patient's obvious swelling, erythema, and tenderness of ankles on physical exam, significantly elevated ESR/CRP, and significant clinical response to just one dose of prednisone is definitely consistent with an inflammatory arthritis  Highest on differential currently is reactive arthritis given acuity of patient's symptoms for the past three weeks; he may have been exposed to an infectious source of unknown etiology that triggered him to develop an inflammatory arthritis response  RF and anti-CCP were ordered today, but returned negative; also, joint symptoms would need to be occurring for at least six weeks before an RA diagnosis can technically be made  Explained to patient that reactive arthritis is usually self-limited, but can recur  Asked him to take prednisone 20mg po daily for a week, then 10mg po daily for a week to treat his current flare; he then should take the anti-inflammatory dose of naproxen 500mg po bid with meals regularly for at least the following two weeks then as needed to control the inflammation  Ordered [facility] ultrasound of bilateral ankles to evaluate for active synovitis, since x-rays would likely not reveal any findings for symptoms this acute  Will see how patient is doing in 6 weeks  Plan:  Diagnoses and all orders for this visit:    Polyarthralgia  -     Ambulatory referral to Rheumatology  -     Aldolase  -     LD,Blood  -     RF Screen w/ Reflex to Titer  -     Cyclic citrul peptide antibody, IgG  -     [facility] limited; Future  -     naproxen (NAPROSYN) 500 mg tablet;  Take 1 tablet (500 mg total) by mouth 2 (two) times a day with meals  -     Uric acid    Elevated C-reactive protein (CRP)  -     Ambulatory referral to Rheumatology  -     Aldolase  -     LD,Blood  -     RF Screen w/ Reflex to Titer  -     Cyclic citrul peptide antibody, IgG  -     [place] [facility] [place]; Future    Inflammatory arthritis    [facility] ([facility]    Follow-up plan: Return to clinic in 6 weeks      Chief Complaint  Chief Complaint   Patient presents with    Abnormal Lab     HPI  Genaro Hendricks is a 47 y o   male who presents as a Rheumatology consult referred by Keven Muñiz DO for evaluation of possible polymyalgia rheumatica  Patient states that three weeks ago, he was working on his truck, moving up and down for 9 hours straight, then three days later, he was unable to walk  He started having pain and swelling in left ankle and foot, which did not start improving until he rested for a few days  The following week, when he went back to work, the pain returned in his feet and also started involving his hips  He complains of having worsening knee and hand stiffness for the past 3 weeks  Of note he burned his right hand three weeks ago, around the time when all his symptoms started  He complains of having pain and weakness in his hips and proximal legs; he was unable to get off a toilet this past Friday  He also was unable to close his hands until he was started on prednisone yesterday  He took prednisone 20mg last night, and started feeling significantly better  He also has been having chills nightly for the past three nights  Of note, patient admits to being prescribed atorvastatin for past several years, but has actually been taking it regularly for the past 3 months  He also takes Vit  D 4,000 units po daily  In the past, he was taking Tylenol or ibuprofen, particularly ibuprofen 400mg po q8 hours when his joints were particularly bothersome  Prior to prednisone, his left ankle pain was 7/10 in severity, and it currently is 1/10 in severity       Review of Systems  Review of Systems   Constitutional: Positive for chills, fatigue and fever  Negative for unexpected weight change  HENT: Positive for mouth sores  Negative for trouble swallowing  Dry mouth   Eyes: Negative for pain and visual disturbance  Respiratory: Positive for shortness of breath  Negative for cough  Cardiovascular: Positive for leg swelling  Negative for chest pain  Gastrointestinal: Negative for abdominal pain, blood in stool, constipation, diarrhea and nausea  Endocrine: Positive for heat intolerance and polydipsia  Genitourinary: Positive for frequency  Negative for hematuria  Musculoskeletal: Positive for arthralgias, joint swelling and myalgias  Negative for back pain  Skin: Negative for rash  Neurological: Positive for weakness  Negative for numbness  Hematological: Positive for adenopathy  Psychiatric/Behavioral: Negative for sleep disturbance         Allergies  No Known Allergies    Home Medications    Current Outpatient Medications:     albuterol (PROVENTIL HFA,VENTOLIN HFA) 90 mcg/act inhaler, Inhale 2 puffs every 6 (six) hours as needed, Disp: , Rfl:     ALPRAZolam (XANAX) 1 mg tablet, Take 1 tablet (1 mg total) by mouth daily at bedtime as needed for anxiety, Disp: 30 tablet, Rfl: 1    atorvastatin (LIPITOR) 40 mg tablet, Take 1 tablet (40 mg total) by mouth daily, Disp: 90 tablet, Rfl: 1    azelastine (ASTELIN) 0 1 % nasal spray, 1 spray into each nostril 2 (two) times a day Use in each nostril as directed, Disp: , Rfl:     fluticasone (FLOVENT HFA) 110 MCG/ACT inhaler, Inhale 2 puffs 2 (two) times a day Rinse mouth after use , Disp: , Rfl:     loratadine (CLARITIN) 10 mg tablet, Take 10 mg by mouth daily, Disp: , Rfl:     predniSONE 20 mg tablet, Take 1 tablet (20 mg total) by mouth 2 (two) times a day with meals, Disp: 30 tablet, Rfl: 1    zolpidem (AMBIEN) 5 mg tablet, Take 1 tablet (5 mg total) by mouth daily at bedtime as needed for sleep, Disp: 30 tablet, Rfl: 2    naproxen (NAPROSYN) 500 mg tablet, Take 1 tablet (500 mg total) by mouth 2 (two) times a day with meals, Disp: 60 tablet, Rfl: 3    Past Medical History  Past Medical History:   Diagnosis Date    Anxiety     Arthritis     Hyperlipidemia        Past Surgical History   Past Surgical History:   Procedure Laterality Date    VASECTOMY      WISDOM TOOTH EXTRACTION         Family History    Family History   Problem Relation Age of Onset    Cancer Mother     Other Father         Premature coronary heart disease    Hypertension Father     Other Family         Premature Coronary Heart Disease    Other Family         Multiple Allergies    Rheum arthritis Brother    Father - quintuplet bypass at the age of 44  Brother - Rheumatoid arthritis, restless leg syndrome  Mother - arthritis (unsure what kind)    Social History  Occupation: self-employed, has an [employer] History     Substance and Sexual Activity   Alcohol Use Yes    Comment: Social     Social History     Substance and Sexual Activity   Drug Use No     Social History     Tobacco Use   Smoking Status Never Smoker   Smokeless Tobacco Never Used       Objective:  Vitals:    10/22/19 1310   BP: 148/92   BP Location: Right arm   Patient Position: Sitting   Cuff Size: Standard   Pulse: 82   Resp: 18   Weight: 83 9 kg (185 lb)   Height: 5' 10" (1 778 m)       Physical Exam   Constitutional: He appears well-developed and well-nourished  No distress  HENT:   Head: Normocephalic and atraumatic  Mouth/Throat: Oropharynx is clear and moist and mucous membranes are normal    Eyes: Conjunctivae and EOM are normal  No scleral icterus  Neck: Neck supple  No spinous process tenderness and no muscular tenderness present  No thyromegaly present  Cardiovascular: Normal rate, regular rhythm, S1 normal and S2 normal  Exam reveals no friction rub  No murmur heard    Pulmonary/Chest: Effort normal and breath sounds normal  No respiratory distress  He has no wheezes  He has no rhonchi  He has no rales  Abdominal: Soft  He exhibits no distension  There is no hepatosplenomegaly  There is no tenderness  Musculoskeletal: He exhibits tenderness  L>R bilateral ankle swelling, tenderness, and erythema   Lymphadenopathy:     He has no cervical adenopathy  Neurological: He is alert  He has normal strength  No sensory deficit  Skin: Skin is warm and dry  Nails show no clubbing  Superficial, erythematous burn of right hand   Psychiatric: He has a normal mood and affect  Reviewed labs  Imaging:   None recent  Labs:   Appointment on 10/22/2019   Component Date Value Ref Range Status    CRP 10/22/2019 41 5* <3 0 mg/L Final    Sed Rate 10/22/2019 54* 0 - 10 mm/hour Final    Total CK 10/22/2019 70  39 - 308 U/L Final   Office Visit on 10/22/2019   Component Date Value Ref Range Status    Aldolase 10/22/2019 5 3  3 3 - 10 3 U/L Final    LD 10/22/2019 184  81 - 234 U/L Final    Rheumatoid Factor 10/22/2019 Negative  Negative Final    Cyclic Citrullin Peptide Ab 10/22/2019 8  0 - 19 units Final                              Negative               <20                            Weak positive      20 - 39                            Moderate positive  40 - 59                            Strong positive        >59    Uric Acid 10/22/2019 4 6  4 2 - 8 0 mg/dL Final    Specimen collection should occur prior to Metamizole administration due to the potential for falsely depressed results     Office Visit on 10/21/2019   Component Date Value Ref Range Status    Color, UA 10/22/2019 Dk Yellow   Final    Clarity, UA 10/22/2019 Clear   Final    Specific [place], UA 10/22/2019 1 027  1 003 - 1 030 Final    pH, UA 10/22/2019 5 5  4 5, 5 0, 5 5, 6 0, 6 5, 7 0, 7 5, 8 0 Final    Leukocytes, UA 10/22/2019 Negative  Negative Final    Nitrite, UA 10/22/2019 Negative  Negative Final    Protein, UA 10/22/2019 30 (1+)* Negative mg/dl Final    Glucose, UA 10/22/2019 Negative  Negative mg/dl Final    Ketones, UA 10/22/2019 Negative  Negative mg/dl Final    Urobilinogen, UA 10/22/2019 0 2  0 2, 1 0 E U /dl E U /dl Final    Bilirubin, UA 10/22/2019 Negative  Negative Final    Blood, UA 10/22/2019 Negative  Negative Final    RBC, UA 10/22/2019 None Seen  None Seen, 0-5 /hpf Final    WBC, UA 10/22/2019 None Seen  None Seen, 0-5, 5-55, 5-65 /hpf Final    Epithelial Cells 10/22/2019 None Seen  None Seen, Occasional /hpf Final    Bacteria, UA 10/22/2019 None Seen  None Seen, Occasional /hpf Final    Hyaline Casts, UA 10/22/2019 None Seen  None Seen /lpf Final   Appointment on 10/14/2019   Component Date Value Ref Range Status    Vit D, 25-Hydroxy 10/14/2019 23 0* 30 0 - 100 0 ng/mL Final    Lyme IgG/IgM Ab 10/14/2019 <0 91  0 00 - 0 90 ISR Final                                    Negative         <0 91                                  Equivocal  0 91 - 1 09                                  Positive         >1 09    CRP 10/14/2019 34 6* <3 0 mg/L Final   Hospital Outpatient Visit on 06/12/2019   Component Date Value Ref Range Status    Protocol Name 06/12/2019 [facility]   Final    Time In Exercise Phase 06/12/2019 00:12:01   Final    MAX   SYSTOLIC BP 60/85/8992 502  mmHg Final    Max Diastolic Bp 38/68/8024 82  mmHg Final    Max Heart Rate 06/12/2019 176  BPM Final    Max Predicted Heart Rate 06/12/2019 170  BPM Final    Reason for Termination 06/12/2019 Fatigue   Final    Test Indication 06/12/2019 high cholesterol   Final    Target Hr Formular 06/12/2019 (220 - Age)*100%   Final    Chest Pain Statement 06/12/2019 none   Final   Orders Only on 11/09/2018   Component Date Value Ref Range Status    Total Cholesterol 11/09/2018 253* <200 mg/dL Final    HDL 11/09/2018 37* >40 mg/dL Final    Triglycerides 11/09/2018 316* <150 mg/dL Final    LDL Direct 11/09/2018 167* mg/dL (calc) Final    Comment: Reference range: <100     Desirable range <100 mg/dL for primary prevention;    <70 mg/dL for patients with CHD or diabetic patients   with > or = 2 CHD risk factors  LDL-C is now calculated using the Sinan-Frazier   calculation, which is a validated novel method providing   better accuracy than the Friedewald equation in the   estimation of LDL-C  Wilmer Fortune  [place] [id number];151(19): [phone]   (http://[facility]/faq/KRK689)      Chol HDLC Ratio 11/09/2018 6 8* <5 0 (calc) Final    Non-HDL Cholesterol 11/09/2018 216* <130 mg/dL (calc) Final    Comment: For patients with diabetes plus 1 major ASCVD risk   factor, treating to a non-HDL-C goal of <100 mg/dL   (LDL-C of <70 mg/dL) is considered a therapeutic   option        Glucose, Random 11/09/2018 91  65 - 99 mg/dL Final    Comment:               Fasting reference interval         BUN 11/09/2018 11  7 - 25 mg/dL Final    Creatinine 11/09/2018 0 96  0 60 - 1 35 mg/dL Final    eGFR Non African American 11/09/2018 92  > OR = 60 mL/min/1 73m2 Final    eGFR  11/09/2018 107  > OR = 60 mL/min/1 73m2 Final    SL AMB BUN/CREATININE RATIO 04/10/2956 NOT APPLICABLE  6 - 22 (calc) Final    Sodium 11/09/2018 137  135 - 146 mmol/L Final    Potassium 11/09/2018 4 6  3 5 - 5 3 mmol/L Final    Chloride 11/09/2018 102  98 - 110 mmol/L Final    CO2 11/09/2018 26  20 - 32 mmol/L Final    SL AMB CALCIUM 11/09/2018 9 1  8 6 - 10 3 mg/dL Final    Protein, Total 11/09/2018 6 7  6 1 - 8 1 g/dL Final    Albumin 11/09/2018 4 1  3 6 - 5 1 g/dL Final    Globulin 11/09/2018 2 6  1 9 - 3 7 g/dL (calc) Final    Albumin/Globulin Ratio 11/09/2018 1 6  1 0 - 2 5 (calc) Final    TOTAL BILIRUBIN 11/09/2018 0 4  0 2 - 1 2 mg/dL Final    Alkaline Phosphatase 11/09/2018 77  40 - 115 U/L Final    AST 11/09/2018 21  10 - 40 U/L Final    ALT 11/09/2018 29  9 - 46 U/L Final    White Blood Cell Count 11/09/2018 6 3  3 8 - 10 8 Thousand/uL Final    Red Blood Cell Count 11/09/2018 4 85  4 20 - 5 80 Million/uL Final    Hemoglobin 11/09/2018 14 8  13 2 - 17 1 g/dL Final    HCT 11/09/2018 42 5  38 5 - 50 0 % Final    MCV 11/09/2018 87 6  80 0 - 100 0 fL Final    MCH 11/09/2018 30 5  27 0 - 33 0 pg Final    MCHC 11/09/2018 34 8  32 0 - 36 0 g/dL Final    RDW 11/09/2018 13 0  11 0 - 15 0 % Final    Platelet Count 73/50/3972 203  140 - 400 Thousand/uL Final    SL AMB MPV 11/09/2018 9 7  7 5 - 12 5 fL Final    Neutrophils (Absolute) 11/09/2018 3,887  1,500 - 7,800 cells/uL Final    Lymphocytes (Absolute) 11/09/2018 1,821  850 - 3,900 cells/uL Final    Monocytes (Absolute) 11/09/2018 410  200 - 950 cells/uL Final    Eosinophils (Absolute) 11/09/2018 151  15 - 500 cells/uL Final    Basophils ABS 11/09/2018 32  0 - 200 cells/uL Final    Neutrophils 11/09/2018 61 7  % Final    Lymphocytes 11/09/2018 28 9  % Final    Monocytes 11/09/2018 6 5  % Final    Eosinophils 11/09/2018 2 4  % Final    Basophils PCT 11/09/2018 0 5  % Final    Vitamin D, 25-Hydroxy, Serum 11/09/2018 17* 30 - 100 ng/mL Final    Comment: Vitamin D Status         25-OH Vitamin D:     Deficiency:                    <20 ng/mL  Insufficiency:             20 - 29 ng/mL  Optimal:                 > or = 30 ng/mL     For 25-OH Vitamin D testing on patients on   D2-supplementation and patients for whom quantitation   of D2 and D3 fractions is required, the QuestAssureD(TM)  25-OH VIT D, (D2,D3), LC/MS/MS is recommended: order   code 07191 (patients >2yrs)  For more information on this test, go to:  http://[employer]/faq/JAT735  (This link is being provided for   informational/educational purposes only )      TSH W/RFX TO FREE T4 11/09/2018 2 51  0 40 - 4 50 mIU/L Final   Office Visit on 10/30/2018   Component Date Value Ref Range Status    Sodium 10/14/2019 137  136 - 145 mmol/L Final    Potassium 10/14/2019 4 3  3 5 - 5 3 mmol/L Final    Chloride 10/14/2019 105  100 - 108 mmol/L Final    CO2 10/14/2019 28 21 - 32 mmol/L Final    ANION GAP 10/14/2019 4  4 - 13 mmol/L Final    BUN 10/14/2019 16  5 - 25 mg/dL Final    Creatinine 10/14/2019 0 93  0 60 - 1 30 mg/dL Final    Standardized to IDMS reference method    Glucose, Fasting 10/14/2019 72  65 - 99 mg/dL Final      Specimen collection should occur prior to Sulfasalazine administration due to the potential for falsely depressed results  Specimen collection should occur prior to Sulfapyridine administration due to the potential for falsely elevated results   Calcium 10/14/2019 9 1  8 3 - 10 1 mg/dL Final    AST 10/14/2019 29  5 - 45 U/L Final      Specimen collection should occur prior to Sulfasalazine administration due to the potential for falsely depressed results   ALT 10/14/2019 76  12 - 78 U/L Final      Specimen collection should occur prior to Sulfasalazine and/or Sulfapyridine administration due to the potential for falsely depressed results   Alkaline Phosphatase 10/14/2019 105  46 - 116 U/L Final    Total Protein 10/14/2019 7 6  6 4 - 8 2 g/dL Final    Albumin 10/14/2019 3 5  3 5 - 5 0 g/dL Final    Total Bilirubin 10/14/2019 0 53  0 20 - 1 00 mg/dL Final    eGFR 10/14/2019 95  ml/min/1 73sq m Final    Cholesterol 10/14/2019 152  50 - 200 mg/dL Final      Cholesterol:       Desirable         <200 mg/dl       Borderline         200-239 mg/dl       High              >239           Triglycerides 10/14/2019 109  <=150 mg/dL Final      Triglyceride:     Normal          <150 mg/dl     Borderline High 150-199 mg/dl     High            200-499 mg/dl        Very High       >499 mg/dl    Specimen collection should occur prior to N-Acetylcysteine or Metamizole administration due to the potential for falsely depressed results      HDL, Direct 10/14/2019 38* 40 - 60 mg/dL Final      HDL Cholesterol:       High    >60 mg/dL       Low     <41 mg/dL  Specimen collection should occur prior to Metamizole administration due to the potential for falsley depressed results   LDL Calculated 10/14/2019 92  0 - 100 mg/dL Final      LDL Cholesterol:     Optimal           <100 mg/dl     Near Optimal      100-129 mg/dl     Above Optimal       Borderline High 130-159 mg/dl       High            160-189 mg/dl       Very High       >189 mg/dl         This screening LDL is a calculated result  It does not have the accuracy of the Direct Measured LDL in the monitoring of patients with hyperlipidemia and/or statin therapy  Direct Measure LDL (FAI408) must be ordered separately in these patients   Non-HDL-Chol (CHOL-HDL) 10/14/2019 114  mg/dl Final    TSH 3RD GENERATON 10/14/2019 2 020  0 358 - 3 740 uIU/mL Final      Using supplements with high doses of biotin 20 to more than 300 times greater than the adequate daily intake for adults of 30 mcg/day as established by the [place] [employer], can cause falsely depress results      WBC 10/14/2019 8 01  4 31 - 10 16 Thousand/uL Final    RBC 10/14/2019 4 65  3 88 - 5 62 Million/uL Final    Hemoglobin 10/14/2019 13 7  12 0 - 17 0 g/dL Final    Hematocrit 10/14/2019 41 9  36 5 - 49 3 % Final    MCV 10/14/2019 90  82 - 98 fL Final    MCH 10/14/2019 29 5  26 8 - 34 3 pg Final    MCHC 10/14/2019 32 7  31 4 - 37 4 g/dL Final    RDW 10/14/2019 12 3  11 6 - 15 1 % Final    MPV 10/14/2019 9 9  8 9 - 12 7 fL Final    Platelets 81/94/6165 287  149 - 390 Thousands/uL Final    nRBC 10/14/2019 0  /100 WBCs Final    Neutrophils Relative 10/14/2019 73  43 - 75 % Final    Immat GRANS % 10/14/2019 0  0 - 2 % Final    Lymphocytes Relative 10/14/2019 16  14 - 44 % Final    Monocytes Relative 10/14/2019 8  4 - 12 % Final    Eosinophils Relative 10/14/2019 2  0 - 6 % Final    Basophils Relative 10/14/2019 1  0 - 1 % Final    Neutrophils Absolute 10/14/2019 5 91  1 85 - 7 62 Thousands/µL Final    Immature Grans Absolute 10/14/2019 0 02  0 00 - 0 20 Thousand/uL Final    Lymphocytes Absolute 10/14/2019 1 27  0 60 - 4 47 Thousands/µL Final    Monocytes Absolute 10/14/2019 0 64  0 17 - 1 22 Thousand/µL Final    Eosinophils Absolute 10/14/2019 0 12  0 00 - 0 61 Thousand/µL Final    Basophils Absolute 10/14/2019 0 05  0 00 - 0 10 Thousands/µL Final

## 2019-10-22 NOTE — PATIENT INSTRUCTIONS
Take prednisone 20mg daily for a week, then 10mg daily for a week  After prednisone is complete, take naproxen 500mg twice a day with meals for at least two weeks  Do ankle ultrasound  Do bloodwork    Return to clinic in 6 weeks    Reactive Arthritis    What is reactive arthritis? -- Reactive arthritis is a kind of arthritis that happens after certain infections  It causes pain and swelling in joints  Reactive arthritis usually affects people who have or just had:  ?Food poisoning or another kind of infection of the intestines  ? An infection that you catch through sex   In the past, reactive arthritis was sometimes called "Dipti syndrome "  What are the symptoms of reactive arthritis? -- The main symptoms of reactive arthritis are pain and swelling in the joints  These usually happen 1 to 4 weeks after an infection  In most cases, the symptoms affect only a few joints, usually in the knees, ankles, or feet  Other symptoms might include:  ?Pain in the tendons in the feet and ankles (tendons are tough bands of tissue that connect muscles to bones)  ? Irritation of the eye called "conjunctivitis" (also known as pink eye)  ? Pain when urinating  Is there a test for reactive arthritis? -- No  There is no test  But if your doctor or nurse can figure out what type of germ caused your infection, he or she should be able to tell if you have reactive arthritis  Your doctor or nurse can test your stool (bowel movements) or urine to look for certain kinds of germs  If your doctor or nurse can't tell what germs caused your infection, he or she will study your symptoms to decide how likely it is that you have reactive arthritis  How is reactive arthritis treated? -- The symptoms of reactive arthritis are treated with medicines, including:  ? NSAIDs - This is a large group of medicines that includes ibuprofen (sample brand names: Advil, Motrin) and naproxen (sample brand name: Aleve)   Your doctor or nurse might prescribe a dose higher than you would normally take to relieve pain  ? Other medicines - If NSAIDs do not help your symptoms, your doctor or nurse might give you a shot of steroids  Steroids help reduce inflammation  These are not the same as the steroids some athletes take illegally  Your doctor might also give you more steroids to take at home  There are also other medicines that might help if your symptoms do not get better with NSAIDs or steroids  ? Eye drops - Special drops can help relieve redness and irritation in your eyes  But if you have eye pain or trouble seeing, visit an eye doctor to make sure you don't have a more serious problem  Antibiotics do not usually help with the joint symptoms of reactive arthritis  Even so, your doctor or nurse might prescribe them if you still have an infection  When will I feel better? -- Most people with reactive arthritis get better quickly  Some people continue to notice symptoms, either constantly or just once in a while  If your back gets very stiff and sore, see your doctor or nurse   This might mean that your reactive arthritis has turned into a more serious problem

## 2019-10-22 NOTE — TELEPHONE ENCOUNTER
Pt left a Vm to let you know that the prednisone worked he is 95% symptom free today he will be seeing the specialist today

## 2019-10-23 LAB
ALDOLASE SERPL-CCNC: 5.3 U/L (ref 3.3–10.3)
RHEUMATOID FACT SER QL LA: NEGATIVE

## 2019-10-24 LAB — CCP IGA+IGG SERPL IA-ACNC: 8 UNITS (ref 0–19)

## 2019-10-29 ENCOUNTER — OFFICE VISIT (OUTPATIENT)
Dept: FAMILY MEDICINE CLINIC | Facility: CLINIC | Age: 49
End: 2019-10-29
Payer: COMMERCIAL

## 2019-10-29 VITALS
OXYGEN SATURATION: 98 % | HEIGHT: 70 IN | DIASTOLIC BLOOD PRESSURE: 76 MMHG | TEMPERATURE: 97.4 F | BODY MASS INDEX: 27.16 KG/M2 | SYSTOLIC BLOOD PRESSURE: 130 MMHG | HEART RATE: 80 BPM | WEIGHT: 189.7 LBS | RESPIRATION RATE: 18 BRPM

## 2019-10-29 DIAGNOSIS — Z23 NEED FOR INFLUENZA VACCINATION: ICD-10-CM

## 2019-10-29 DIAGNOSIS — D17.1 LIPOMA OF TORSO: ICD-10-CM

## 2019-10-29 DIAGNOSIS — Z00.00 ENCOUNTER FOR ANNUAL PHYSICAL EXAM: Primary | ICD-10-CM

## 2019-10-29 DIAGNOSIS — M02.30 REACTIVE ARTHRITIS (HCC): ICD-10-CM

## 2019-10-29 DIAGNOSIS — E66.3 OVERWEIGHT (BMI 25.0-29.9): ICD-10-CM

## 2019-10-29 PROBLEM — M26.621 ARTHRALGIA OF RIGHT TEMPOROMANDIBULAR JOINT: Status: RESOLVED | Noted: 2018-10-30 | Resolved: 2019-10-29

## 2019-10-29 PROBLEM — M48.02 CERVICAL STENOSIS OF SPINAL CANAL: Status: RESOLVED | Noted: 2018-06-04 | Resolved: 2019-10-29

## 2019-10-29 PROCEDURE — 90471 IMMUNIZATION ADMIN: CPT

## 2019-10-29 PROCEDURE — 99396 PREV VISIT EST AGE 40-64: CPT | Performed by: INTERNAL MEDICINE

## 2019-10-29 PROCEDURE — 90682 RIV4 VACC RECOMBINANT DNA IM: CPT

## 2019-10-29 RX ORDER — PREDNISONE 10 MG/1
TABLET ORAL
Refills: 1 | COMMUNITY
Start: 2019-10-21 | End: 2019-11-20 | Stop reason: ALTCHOICE

## 2019-10-29 NOTE — PROGRESS NOTES
Assessment/Plan:     Diagnoses and all orders for this visit:    Encounter for annual physical exam    Discussed BRITTNI Cochran  Cscope will be scheduled  Will do influenza today  Overweight (BMI 25 0-29  9)    BMI Counseling: Body mass index is 27 22 kg/m²  The BMI is above normal  Exercise recommendations include exercising 3-5 times per week  Reactive arthritis (HCC)  -Elevated CRP/Sed rate  Tapering on Prednisone and is being followed by Rheumatology as well    Other orders  -     predniSONE 10 mg tablet; TAKE 2 TABLETS (20 MG TOTAL) BY MOUTH 2 (TWO) TIMES A DAY WITH MEALS      He does have a lipoma of the right chest region that is rather large in size but has been stable  I did give him a referral to Surgery if he would like this removed  Subjective:      Patient ID: Layne Gastelum is a 47 y o  male  Basil Exon is here today for a routine physical examination  Please see last few office notes for recent details  He reports feeling great currently on the prednisone  He is down to 10 mg and reports feeling slightly more in the ankles now but tolerable  He does have an upcoming u/s of the ankles to perform  Other chart was reviewed and updated  ROS is largely negative  Reports known poor vision and will eventually schedule his follow up  Also notes some anxiety over household matters but is handling this okay  The following portions of the patient's history were reviewed and updated as appropriate: allergies, current medications, past family history, past medical history, past social history, past surgical history and problem list     Review of Systems   Constitutional: Negative for chills, fever and unexpected weight change  HENT: Negative for sinus pressure  Eyes: Positive for visual disturbance  Respiratory: Negative for cough, chest tightness, shortness of breath and wheezing  Cardiovascular: Negative for chest pain, palpitations and leg swelling     Gastrointestinal: Negative for abdominal pain, blood in stool, constipation, diarrhea, nausea and vomiting  Genitourinary: Negative for difficulty urinating, dysuria and testicular pain  Musculoskeletal: Positive for arthralgias  Negative for back pain and myalgias  Skin: Negative  Neurological: Negative for dizziness, syncope, numbness and headaches  Psychiatric/Behavioral: Negative for dysphoric mood and sleep disturbance  The patient is not nervous/anxious  Objective:      /76   Pulse 80   Temp (!) 97 4 °F (36 3 °C)   Resp 18   Ht 5' 10" (1 778 m)   Wt 86 kg (189 lb 11 2 oz)   SpO2 98%   BMI 27 22 kg/m²          Physical Exam   Constitutional: He is oriented to person, place, and time  He appears well-developed and well-nourished  No distress  HENT:   Head: Normocephalic and atraumatic  Right Ear: External ear normal    Left Ear: External ear normal    Mouth/Throat: Oropharynx is clear and moist    Eyes: Conjunctivae and EOM are normal  Right eye exhibits no discharge  Left eye exhibits no discharge  No scleral icterus  Neck: Normal range of motion  No tracheal deviation present  No thyromegaly present  Cardiovascular: Normal rate, regular rhythm and normal heart sounds  No murmur heard  Pulmonary/Chest: Effort normal and breath sounds normal  No respiratory distress  He has no wheezes  He exhibits no tenderness  Abdominal: Soft  Bowel sounds are normal  There is no tenderness  There is no rebound  Musculoskeletal: Normal range of motion  He exhibits no edema  Lymphadenopathy:     He has no cervical adenopathy  Neurological: He is alert and oriented to person, place, and time  He has normal reflexes  No cranial nerve deficit  Skin: Skin is warm and dry  He is not diaphoretic  No erythema  Lipoma like lesion of the right chest region    Psychiatric: He has a normal mood and affect  His speech is normal and behavior is normal  Judgment and thought content normal    Vitals reviewed

## 2019-10-31 ENCOUNTER — TRANSCRIBE ORDERS (OUTPATIENT)
Dept: RADIOLOGY | Facility: HOSPITAL | Age: 49
End: 2019-10-31

## 2019-10-31 ENCOUNTER — HOSPITAL ENCOUNTER (OUTPATIENT)
Dept: RADIOLOGY | Facility: HOSPITAL | Age: 49
Discharge: HOME | End: 2019-10-31
Attending: INTERNAL MEDICINE
Payer: COMMERCIAL

## 2019-10-31 DIAGNOSIS — R79.82 ELEVATED C-REACTIVE PROTEIN (CRP): ICD-10-CM

## 2019-10-31 DIAGNOSIS — M25.50 POLYARTHRALGIA: ICD-10-CM

## 2019-10-31 PROCEDURE — 76881 US COMPL JOINT R-T W/IMG: CPT

## 2019-11-20 ENCOUNTER — CONSULT (OUTPATIENT)
Dept: SURGERY | Facility: MEDICAL CENTER | Age: 49
End: 2019-11-20
Payer: COMMERCIAL

## 2019-11-20 VITALS
WEIGHT: 182.6 LBS | RESPIRATION RATE: 16 BRPM | TEMPERATURE: 96.3 F | SYSTOLIC BLOOD PRESSURE: 112 MMHG | HEIGHT: 70 IN | HEART RATE: 76 BPM | DIASTOLIC BLOOD PRESSURE: 80 MMHG | BODY MASS INDEX: 26.14 KG/M2

## 2019-11-20 DIAGNOSIS — R22.2 MASS OF SKIN OF BACK: ICD-10-CM

## 2019-11-20 DIAGNOSIS — R22.2 MASS OF CHEST WALL, RIGHT: Primary | ICD-10-CM

## 2019-11-20 DIAGNOSIS — D17.1 LIPOMA OF TORSO: ICD-10-CM

## 2019-11-20 PROCEDURE — 99243 OFF/OP CNSLTJ NEW/EST LOW 30: CPT | Performed by: SURGERY

## 2019-11-20 NOTE — PROGRESS NOTES
Assessment/Plan: Cory Sanders is a 47year old who presents today per referral by Dr Julianne Mcdaniel regarding lipoma on the chest  There is a subcutaneous mass of the right chest measuring 6 cm x 3 cm and a skin lesion of the left back that is slightly raised with irregular edges measuring 1 cm x 1 cm  Explained the etiology of the subcutaneous mass and skin lesion  Discussed the risks, benefits and alternatives to a subcutaneous mass and skin lesion excision in the operating room  Explained the procedure as well as pre- and post- procedural protocols  The office is going to schedule him for the procedure  He knows to contact the office if any questions or concerns arise  No problem-specific Assessment & Plan notes found for this encounter  Diagnoses and all orders for this visit:    Mass of chest wall, right  -     Case request operating room: EXCISION  BIOPSY LESION/MASS LEFT BACK AND RIGHT CHEST WALL; Standing  -     Case request operating room: EXCISION  BIOPSY LESION/MASS LEFT BACK AND RIGHT CHEST WALL  -     Reason for no Mechanical VTE Prophylaxis; Standing  -     lactated ringers infusion  -     ceFAZolin (ANCEF) IVPB (premix) 1,000 mg    Lipoma of torso  -     Ambulatory referral to General Surgery    Mass of skin of back  -     Case request operating room: EXCISION  BIOPSY LESION/MASS LEFT BACK AND RIGHT CHEST WALL; Standing  -     Case request operating room: EXCISION  BIOPSY LESION/MASS LEFT BACK AND RIGHT CHEST WALL  -     Reason for no Mechanical VTE Prophylaxis; Standing  -     lactated ringers infusion  -     ceFAZolin (ANCEF) IVPB (premix) 1,000 mg    Other orders  -     Diet NPO; Sips with meds; Standing  -     Apply Sequential Compression Device; Standing  -     Place sequential compression device; Standing          Subjective:      Patient ID: Bessy Nelson is a 47 y o  male      Cory Sanders is a 47year old who presents today per referral by Dr Julianne Mcdaniel regarding lipoma on the chest  He reports having a skin lesion on the back   He also reports having mass on his chest, he says that the lump gives him anxiety and it freaks him out when he sees it in the mirror  He reports that his wife has had 8 major surgeries and he just feels anxious  The following portions of the patient's history were reviewed and updated as appropriate: allergies, current medications, past family history, past medical history, past social history, past surgical history and problem list     Review of Systems   Constitutional: Negative  HENT: Negative  Eyes: Negative  Respiratory: Negative  Cardiovascular: Negative  Gastrointestinal: Negative  Endocrine: Negative  Genitourinary: Negative  Musculoskeletal: Negative  Skin:        Skin tag on the back  Mass on the chest   Allergic/Immunologic: Negative  Neurological: Negative  Hematological: Negative  Psychiatric/Behavioral: Negative  Objective:      /80   Pulse 76   Temp (!) 96 3 °F (35 7 °C) (Tympanic)   Resp 16   Ht 5' 10" (1 778 m)   Wt 82 8 kg (182 lb 9 6 oz)   BMI 26 20 kg/m²          Physical Exam   Constitutional: He appears well-developed and well-nourished  No distress  Cardiovascular: Normal rate, regular rhythm, normal heart sounds and intact distal pulses  Pulmonary/Chest: Effort normal and breath sounds normal  No stridor  No respiratory distress  Abdominal: Soft  Bowel sounds are normal  He exhibits no distension and no mass  There is no tenderness  There is no rebound and no guarding  No hernia  Skin: He is not diaphoretic  Subcutaneous mass of the right chest measuring 6 cm x 3 cm   Skin lesion of the right back measuring about 1 cm x 1 cm, slightly raised   Nursing note and vitals reviewed          By signing my name below, Juan Antonio Pavon, attest that this documentation has been prepared under the direction and in the presence of Cathy Hernandez MD  Electronically Signed: Delilah Richard  11/20/19  Maya Ortega, personally performed the services described in this documentation  All medical record entries made by the quanibmarcial were at my direction and in my presence  I have reviewed the chart and discharge instructions and agree that the record reflects my personal performance and is accurate and complete  Angela Campo MD  11/20/19

## 2019-12-04 RX ORDER — CEFAZOLIN SODIUM 1 G/50ML
1000 SOLUTION INTRAVENOUS ONCE
Status: CANCELLED | OUTPATIENT
Start: 2019-12-20 | End: 2019-12-16

## 2019-12-04 RX ORDER — SODIUM CHLORIDE, SODIUM LACTATE, POTASSIUM CHLORIDE, CALCIUM CHLORIDE 600; 310; 30; 20 MG/100ML; MG/100ML; MG/100ML; MG/100ML
125 INJECTION, SOLUTION INTRAVENOUS CONTINUOUS
Status: CANCELLED | OUTPATIENT
Start: 2019-12-20

## 2019-12-12 ENCOUNTER — OFFICE VISIT (OUTPATIENT)
Dept: RHEUMATOLOGY | Facility: CLINIC | Age: 49
End: 2019-12-12
Payer: COMMERCIAL

## 2019-12-12 ENCOUNTER — APPOINTMENT (OUTPATIENT)
Dept: LAB | Facility: MEDICAL CENTER | Age: 49
End: 2019-12-12
Payer: COMMERCIAL

## 2019-12-12 VITALS — BODY MASS INDEX: 25.77 KG/M2 | HEIGHT: 70 IN | RESPIRATION RATE: 18 BRPM | WEIGHT: 180 LBS

## 2019-12-12 DIAGNOSIS — M25.50 POLYARTHRALGIA: Primary | ICD-10-CM

## 2019-12-12 DIAGNOSIS — M02.30 REACTIVE ARTHRITIS (HCC): ICD-10-CM

## 2019-12-12 DIAGNOSIS — M19.90 INFLAMMATORY ARTHRITIS: ICD-10-CM

## 2019-12-12 DIAGNOSIS — Z12.11 SCREENING FOR COLON CANCER: ICD-10-CM

## 2019-12-12 LAB
ALBUMIN SERPL BCP-MCNC: 4.3 G/DL (ref 3.5–5)
ALP SERPL-CCNC: 100 U/L (ref 46–116)
ALT SERPL W P-5'-P-CCNC: 48 U/L (ref 12–78)
ANION GAP SERPL CALCULATED.3IONS-SCNC: 3 MMOL/L (ref 4–13)
AST SERPL W P-5'-P-CCNC: 19 U/L (ref 5–45)
BASOPHILS # BLD AUTO: 0.04 THOUSANDS/ΜL (ref 0–0.1)
BASOPHILS NFR BLD AUTO: 1 % (ref 0–1)
BILIRUB SERPL-MCNC: 0.53 MG/DL (ref 0.2–1)
BUN SERPL-MCNC: 19 MG/DL (ref 5–25)
CALCIUM SERPL-MCNC: 9.1 MG/DL (ref 8.3–10.1)
CHLORIDE SERPL-SCNC: 106 MMOL/L (ref 100–108)
CO2 SERPL-SCNC: 29 MMOL/L (ref 21–32)
CREAT SERPL-MCNC: 0.92 MG/DL (ref 0.6–1.3)
CRP SERPL QL: <3 MG/L
EOSINOPHIL # BLD AUTO: 0.43 THOUSAND/ΜL (ref 0–0.61)
EOSINOPHIL NFR BLD AUTO: 6 % (ref 0–6)
ERYTHROCYTE [DISTWIDTH] IN BLOOD BY AUTOMATED COUNT: 13.2 % (ref 11.6–15.1)
ERYTHROCYTE [SEDIMENTATION RATE] IN BLOOD: 12 MM/HOUR (ref 0–10)
GFR SERPL CREATININE-BSD FRML MDRD: 97 ML/MIN/1.73SQ M
GLUCOSE SERPL-MCNC: 76 MG/DL (ref 65–140)
HCT VFR BLD AUTO: 44 % (ref 36.5–49.3)
HGB BLD-MCNC: 14.2 G/DL (ref 12–17)
IMM GRANULOCYTES # BLD AUTO: 0.02 THOUSAND/UL (ref 0–0.2)
IMM GRANULOCYTES NFR BLD AUTO: 0 % (ref 0–2)
LYMPHOCYTES # BLD AUTO: 1.85 THOUSANDS/ΜL (ref 0.6–4.47)
LYMPHOCYTES NFR BLD AUTO: 26 % (ref 14–44)
MCH RBC QN AUTO: 28.9 PG (ref 26.8–34.3)
MCHC RBC AUTO-ENTMCNC: 32.3 G/DL (ref 31.4–37.4)
MCV RBC AUTO: 90 FL (ref 82–98)
MONOCYTES # BLD AUTO: 0.66 THOUSAND/ΜL (ref 0.17–1.22)
MONOCYTES NFR BLD AUTO: 9 % (ref 4–12)
NEUTROPHILS # BLD AUTO: 4.2 THOUSANDS/ΜL (ref 1.85–7.62)
NEUTS SEG NFR BLD AUTO: 58 % (ref 43–75)
NRBC BLD AUTO-RTO: 0 /100 WBCS
PLATELET # BLD AUTO: 186 THOUSANDS/UL (ref 149–390)
PMV BLD AUTO: 9.5 FL (ref 8.9–12.7)
POTASSIUM SERPL-SCNC: 4.1 MMOL/L (ref 3.5–5.3)
PROT SERPL-MCNC: 7.6 G/DL (ref 6.4–8.2)
RBC # BLD AUTO: 4.91 MILLION/UL (ref 3.88–5.62)
SODIUM SERPL-SCNC: 138 MMOL/L (ref 136–145)
WBC # BLD AUTO: 7.2 THOUSAND/UL (ref 4.31–10.16)

## 2019-12-12 PROCEDURE — 36415 COLL VENOUS BLD VENIPUNCTURE: CPT | Performed by: INTERNAL MEDICINE

## 2019-12-12 PROCEDURE — 85652 RBC SED RATE AUTOMATED: CPT | Performed by: INTERNAL MEDICINE

## 2019-12-12 PROCEDURE — 80053 COMPREHEN METABOLIC PANEL: CPT | Performed by: INTERNAL MEDICINE

## 2019-12-12 PROCEDURE — 86140 C-REACTIVE PROTEIN: CPT | Performed by: INTERNAL MEDICINE

## 2019-12-12 PROCEDURE — 85025 COMPLETE CBC W/AUTO DIFF WBC: CPT | Performed by: INTERNAL MEDICINE

## 2019-12-12 PROCEDURE — 99214 OFFICE O/P EST MOD 30 MIN: CPT | Performed by: INTERNAL MEDICINE

## 2019-12-12 RX ORDER — NAPROXEN 500 MG/1
500 TABLET ORAL 2 TIMES DAILY WITH MEALS
Qty: 60 TABLET | Refills: 3 | Status: SHIPPED | OUTPATIENT
Start: 2019-12-12

## 2019-12-12 NOTE — PROGRESS NOTES
Assessment and Plan: Marlene Nelson is a 47 y o  male who presents for follow-up of his inflammatory arthritis, possibly reactive arthritis with a self-limited episode  He has not had recurrence of his arthritis symptoms since completing his recent prednisone course  He should continue naproxen 500mg po bid as needed  Repeat ESR/CRP ordered and completed today returned dramatically improved  CBC/CMP were unremarkable as well  He can return to clinic as needed  Plan:  Diagnoses and all orders for this visit:    Polyarthralgia  -     CBC and differential  -     Comprehensive metabolic panel  -     C-reactive protein  -     Sedimentation rate, automated  -     naproxen (NAPROSYN) 500 mg tablet; Take 1 tablet (500 mg total) by mouth 2 (two) times a day with meals (Patient taking differently: Take 500 mg by mouth daily with breakfast )    Screening for colon cancer  -     Ambulatory referral to Gastroenterology; Future    Reactive arthritis ([facility] )    Inflammatory arthritis    Follow-up plan: Return to clinic in 6 months or as needed      Rheumatic Disease Summary  Marlene Nelson is a 47 y o  male who originally presented on 10/22/19 as a Rheumatology consult referred by his PCP Starr Gonzalez DO for evaluation of inflammatory arthritis  His obvious swelling, erythema, and tenderness of ankles on physical exam, significantly elevated ESR/CRP, and significant clinical response to just one dose of prednisone was definitely consistent with an inflammatory arthritis  Highest on differential was reactive arthritis given acuity of patient's symptoms for three weeks; he may have been exposed to an infectious source of unknown etiology that triggered him to develop an inflammatory arthritis response  RF and anti-CCP were ordered, but returned negative  Explained to patient that reactive arthritis is usually self-limited, but can recur   Asked him to take prednisone 20mg po daily for a week, then 10mg po daily for a week to treat his current flare; he was asked to then take the anti-inflammatory dose of naproxen 500mg po bid with meals regularly for at least the following two weeks then as needed to control the inflammation  Ordered [facility] ultrasound of bilateral ankles to evaluate for active synovitis, but this study returned unremarkable by the time he was able to get it, since his flare had resolved by then  DORIAN  Terva Alcantar is a 47 y o   male who presents for follow-up of his inflammatory arthritis, possibly reactive arthritis  Last clinic visit was 10/22/19  He completed his prednisone course as prescribed without recurrence of symptoms, and has been taking Vit  D 2,000 units po daily, statin daily, and naproxen 500mg po daily  He has no complaints  The following portions of the patient's history were reviewed and updated as appropriate: allergies, current medications, past family history, past medical history, past social history, past surgical history and problem list     Review of Systems:   Review of Systems   Constitutional: Negative for chills, fatigue, fever and unexpected weight change  HENT: Negative for mouth sores and trouble swallowing  Eyes: Negative for pain and visual disturbance  Respiratory: Negative for cough and shortness of breath  Cardiovascular: Negative for chest pain and leg swelling  Gastrointestinal: Negative for abdominal pain, blood in stool, constipation, diarrhea and nausea  Genitourinary: Negative for hematuria  Musculoskeletal: Positive for back pain, myalgias and neck pain  Negative for arthralgias and joint swelling  Skin: Negative for rash  Allergic/Immunologic: Positive for environmental allergies  Neurological: Negative for weakness and numbness  Hematological: Negative for adenopathy  Psychiatric/Behavioral: Negative for sleep disturbance         Home Medications:    Current Outpatient Medications:     albuterol (PROVENTIL HFA,VENTOLIN HFA) 90 mcg/act inhaler, Inhale 2 puffs every 6 (six) hours as needed, Disp: , Rfl:     ALPRAZolam (XANAX) 1 mg tablet, Take 1 tablet (1 mg total) by mouth daily at bedtime as needed for anxiety, Disp: 30 tablet, Rfl: 1    atorvastatin (LIPITOR) 40 mg tablet, Take 1 tablet (40 mg total) by mouth daily, Disp: 90 tablet, Rfl: 1    azelastine (ASTELIN) 0 1 % nasal spray, 1 spray into each nostril 2 (two) times a day as needed Use in each nostril as directed , Disp: , Rfl:     fluticasone (FLOVENT HFA) 110 MCG/ACT inhaler, Inhale 2 puffs 2 (two) times a day as needed Rinse mouth after use , Disp: , Rfl:     loratadine (CLARITIN) 10 mg tablet, Take 10 mg by mouth daily as needed , Disp: , Rfl:     naproxen (NAPROSYN) 500 mg tablet, Take 1 tablet (500 mg total) by mouth 2 (two) times a day with meals (Patient taking differently: Take 500 mg by mouth daily with breakfast ), Disp: 60 tablet, Rfl: 3    zolpidem (AMBIEN) 5 mg tablet, Take 1 tablet (5 mg total) by mouth daily at bedtime as needed for sleep, Disp: 30 tablet, Rfl: 2    Objective:    Vitals:    12/12/19 1610   Resp: 18   Weight: 81 6 kg (180 lb)   Height: 5' 10" (1 778 m)       Physical Exam   Constitutional: He appears well-developed and well-nourished  He is cooperative  No distress  HENT:   Head: Normocephalic and atraumatic  Mouth/Throat: Oropharynx is clear and moist and mucous membranes are normal    Eyes: Conjunctivae and EOM are normal  No scleral icterus  Neck: Neck supple  No spinous process tenderness and no muscular tenderness present  No thyromegaly present  Cardiovascular: Normal rate, regular rhythm, S1 normal and S2 normal  Exam reveals no friction rub  No murmur heard  Pulmonary/Chest: Breath sounds normal  No respiratory distress  He has no wheezes  He has no rhonchi  He has no rales  Musculoskeletal: He exhibits no tenderness  Lymphadenopathy:     He has no cervical adenopathy  Neurological: He is alert  No sensory deficit     Skin: Skin is warm and dry  No rash noted  Nails show no clubbing  Psychiatric: He has a normal mood and affect  Reviewed labs and imaging  Imaging:   [facility] Ultrasound of Bilateral Ankles 10/31/19  IMPRESSION:  Normal ultrasound of bilateral ankles, without evidence of inflammatory arthritis  Labs:   Office Visit on 12/12/2019   Component Date Value Ref Range Status    WBC 12/12/2019 7 20  4  31 - 10 16 Thousand/uL Final    RBC 12/12/2019 4 91  3 88 - 5 62 Million/uL Final    Hemoglobin 12/12/2019 14 2  12 0 - 17 0 g/dL Final    Hematocrit 12/12/2019 44 0  36 5 - 49 3 % Final    MCV 12/12/2019 90  82 - 98 fL Final    MCH 12/12/2019 28 9  26 8 - 34 3 pg Final    MCHC 12/12/2019 32 3  31 4 - 37 4 g/dL Final    RDW 12/12/2019 13 2  11 6 - 15 1 % Final    MPV 12/12/2019 9 5  8 9 - 12 7 fL Final    Platelets 58/47/4681 186  149 - 390 Thousands/uL Final    nRBC 12/12/2019 0  /100 WBCs Final    Neutrophils Relative 12/12/2019 58  43 - 75 % Final    Immat GRANS % 12/12/2019 0  0 - 2 % Final    Lymphocytes Relative 12/12/2019 26  14 - 44 % Final    Monocytes Relative 12/12/2019 9  4 - 12 % Final    Eosinophils Relative 12/12/2019 6  0 - 6 % Final    Basophils Relative 12/12/2019 1  0 - 1 % Final    Neutrophils Absolute 12/12/2019 4 20  1 85 - 7 62 Thousands/µL Final    Immature Grans Absolute 12/12/2019 0 02  0 00 - 0 20 Thousand/uL Final    Lymphocytes Absolute 12/12/2019 1 85  0 60 - 4 47 Thousands/µL Final    Monocytes Absolute 12/12/2019 0 66  0 17 - 1 22 Thousand/µL Final    Eosinophils Absolute 12/12/2019 0 43  0 00 - 0 61 Thousand/µL Final    Basophils Absolute 12/12/2019 0 04  0 00 - 0 10 Thousands/µL Final    Sodium 12/12/2019 138  136 - 145 mmol/L Final    Potassium 12/12/2019 4 1  3 5 - 5 3 mmol/L Final    Chloride 12/12/2019 106  100 - 108 mmol/L Final    CO2 12/12/2019 29  21 - 32 mmol/L Final    ANION GAP 12/12/2019 3* 4 - 13 mmol/L Final    BUN 12/12/2019 19  5 - 25 mg/dL Final    Creatinine 12/12/2019 0 92  0 60 - 1 30 mg/dL Final    Standardized to IDMS reference method    Glucose 12/12/2019 76  65 - 140 mg/dL Final      If the patient is fasting, the ADA then defines impaired fasting glucose as > 100 mg/dL and diabetes as > or equal to 123 mg/dL  Specimen collection should occur prior to Sulfasalazine administration due to the potential for falsely depressed results  Specimen collection should occur prior to Sulfapyridine administration due to the potential for falsely elevated results   Calcium 12/12/2019 9 1  8 3 - 10 1 mg/dL Final    AST 12/12/2019 19  5 - 45 U/L Final      Specimen collection should occur prior to Sulfasalazine administration due to the potential for falsely depressed results   ALT 12/12/2019 48  12 - 78 U/L Final      Specimen collection should occur prior to Sulfasalazine and/or Sulfapyridine administration due to the potential for falsely depressed results       Alkaline Phosphatase 12/12/2019 100  46 - 116 U/L Final    Total Protein 12/12/2019 7 6  6 4 - 8 2 g/dL Final    Albumin 12/12/2019 4 3  3 5 - 5 0 g/dL Final    Total Bilirubin 12/12/2019 0 53  0 20 - 1 00 mg/dL Final    eGFR 12/12/2019 97  ml/min/1 73sq m Final    CRP 12/12/2019 <3 0  <3 0 mg/L Final    Sed Rate 12/12/2019 12* 0 - 10 mm/hour Final   Appointment on 10/22/2019   Component Date Value Ref Range Status    CRP 10/22/2019 41 5* <3 0 mg/L Final    Sed Rate 10/22/2019 54* 0 - 10 mm/hour Final    Total CK 10/22/2019 70  39 - 308 U/L Final   Office Visit on 10/22/2019   Component Date Value Ref Range Status    Aldolase 10/22/2019 5 3  3 3 - 10 3 U/L Final    LD 10/22/2019 184  81 - 234 U/L Final    Rheumatoid Factor 10/22/2019 Negative  Negative Final    Cyclic Citrullin Peptide Ab 10/22/2019 8  0 - 19 units Final                              Negative               <20                            Weak positive      20 - 39                            Moderate positive  40 - 59 Strong positive        >59    Uric Acid 10/22/2019 4 6  4 2 - 8 0 mg/dL Final    Specimen collection should occur prior to Metamizole administration due to the potential for falsely depressed results  Office Visit on 10/21/2019   Component Date Value Ref Range Status    Color, UA 10/22/2019 Dk Yellow   Final    Clarity, UA 10/22/2019 Clear   Final    Specific [place], UA 10/22/2019 1 027  1 003 - 1 030 Final    pH, UA 10/22/2019 5 5  4 5, 5 0, 5 5, 6 0, 6 5, 7 0, 7 5, 8 0 Final    Leukocytes, UA 10/22/2019 Negative  Negative Final    Nitrite, UA 10/22/2019 Negative  Negative Final    Protein, UA 10/22/2019 30 (1+)* Negative mg/dl Final    Glucose, UA 10/22/2019 Negative  Negative mg/dl Final    Ketones, UA 10/22/2019 Negative  Negative mg/dl Final    Urobilinogen, UA 10/22/2019 0 2  0 2, 1 0 E U /dl E U /dl Final    Bilirubin, UA 10/22/2019 Negative  Negative Final    Blood, UA 10/22/2019 Negative  Negative Final    RBC, UA 10/22/2019 None Seen  None Seen, 0-5 /hpf Final    WBC, UA 10/22/2019 None Seen  None Seen, 0-5, 5-55, 5-65 /hpf Final    Epithelial Cells 10/22/2019 None Seen  None Seen, Occasional /hpf Final    Bacteria, UA 10/22/2019 None Seen  None Seen, Occasional /hpf Final    Hyaline Casts, UA 10/22/2019 None Seen  None Seen /lpf Final   Appointment on 10/14/2019   Component Date Value Ref Range Status    Vit D, 25-Hydroxy 10/14/2019 23 0* 30 0 - 100 0 ng/mL Final    Lyme IgG/IgM Ab 10/14/2019 <0 91  0 00 - 0 90 ISR Final                                    Negative         <0 91                                  Equivocal  0 91 - 1 09                                  Positive         >1 09    CRP 10/14/2019 34 6* <3 0 mg/L Final   Hospital Outpatient Visit on 06/12/2019   Component Date Value Ref Range Status    Protocol Name 06/12/2019 [facility]   Final    Time In Exercise Phase 06/12/2019 00:12:01   Final    MAX   SYSTOLIC BP 52/62/3150 512  mmHg Final    Max Diastolic Bp 39/67/6089 82  mmHg Final    Max Heart Rate 06/12/2019 176  BPM Final    Max Predicted Heart Rate 06/12/2019 170  BPM Final    Reason for Termination 06/12/2019 Fatigue   Final    Test Indication 06/12/2019 high cholesterol   Final    Target Hr Formular 06/12/2019 (220 - Age)*100%   Final    Chest Pain Statement 06/12/2019 none   Final

## 2019-12-12 NOTE — PATIENT INSTRUCTIONS
Continue naproxen twice a day as needed  Return to clinic in 6 months or as needed    Reactive Arthritis    What is reactive arthritis? -- Reactive arthritis is a kind of arthritis that happens after certain infections  It causes pain and swelling in joints  Reactive arthritis usually affects people who have or just had:  ?Food poisoning or another kind of infection of the intestines  ? An infection that you catch through sex   In the past, reactive arthritis was sometimes called "Dipti syndrome "  What are the symptoms of reactive arthritis? -- The main symptoms of reactive arthritis are pain and swelling in the joints  These usually happen 1 to 4 weeks after an infection  In most cases, the symptoms affect only a few joints, usually in the knees, ankles, or feet  Other symptoms might include:  ?Pain in the tendons in the feet and ankles (tendons are tough bands of tissue that connect muscles to bones)  ? Irritation of the eye called "conjunctivitis" (also known as pink eye)  ? Pain when urinating  Is there a test for reactive arthritis? -- No  There is no test  But if your doctor or nurse can figure out what type of germ caused your infection, he or she should be able to tell if you have reactive arthritis  Your doctor or nurse can test your stool (bowel movements) or urine to look for certain kinds of germs  If your doctor or nurse can't tell what germs caused your infection, he or she will study your symptoms to decide how likely it is that you have reactive arthritis  How is reactive arthritis treated? -- The symptoms of reactive arthritis are treated with medicines, including:  ? NSAIDs - This is a large group of medicines that includes ibuprofen (sample brand names: Advil, Motrin) and naproxen (sample brand name: Aleve)  Your doctor or nurse might prescribe a dose higher than you would normally take to relieve pain  ? Other medicines - If NSAIDs do not help your symptoms, your doctor or nurse might give you a shot of steroids  Steroids help reduce inflammation  These are not the same as the steroids some athletes take illegally  Your doctor might also give you more steroids to take at home  There are also other medicines that might help if your symptoms do not get better with NSAIDs or steroids  ? Eye drops - Special drops can help relieve redness and irritation in your eyes  But if you have eye pain or trouble seeing, visit an eye doctor to make sure you don't have a more serious problem  Antibiotics do not usually help with the joint symptoms of reactive arthritis  Even so, your doctor or nurse might prescribe them if you still have an infection  When will I feel better? -- Most people with reactive arthritis get better quickly  Some people continue to notice symptoms, either constantly or just once in a while  If your back gets very stiff and sore, see your doctor or nurse   This might mean that your reactive arthritis has turned into a more serious problem

## 2019-12-17 NOTE — PRE-PROCEDURE INSTRUCTIONS
Pre-Surgery Instructions:   Medication Instructions    albuterol (PROVENTIL HFA,VENTOLIN HFA) 90 mcg/act inhaler Instructed patient per Anesthesia Guidelines   ALPRAZolam (XANAX) 1 mg tablet Instructed patient per Anesthesia Guidelines   atorvastatin (LIPITOR) 40 mg tablet Instructed patient per Anesthesia Guidelines   azelastine (ASTELIN) 0 1 % nasal spray Instructed patient per Anesthesia Guidelines   fluticasone (FLOVENT HFA) 110 MCG/ACT inhaler Instructed patient per Anesthesia Guidelines   loratadine (CLARITIN) 10 mg tablet Instructed patient per Anesthesia Guidelines   naproxen (NAPROSYN) 500 mg tablet Instructed patient per Anesthesia Guidelines   zolpidem (AMBIEN) 5 mg tablet Instructed patient per Anesthesia Guidelines  Instructed has no medications to be taken the morning of surgery  No aspirin, NSAIDs, vitamins, or supplements from now until after surgery

## 2019-12-19 ENCOUNTER — ANESTHESIA EVENT (OUTPATIENT)
Dept: PERIOP | Facility: HOSPITAL | Age: 49
End: 2019-12-19
Payer: COMMERCIAL

## 2019-12-20 ENCOUNTER — ANESTHESIA (OUTPATIENT)
Dept: PERIOP | Facility: HOSPITAL | Age: 49
End: 2019-12-20
Payer: COMMERCIAL

## 2019-12-20 ENCOUNTER — HOSPITAL ENCOUNTER (OUTPATIENT)
Facility: HOSPITAL | Age: 49
Setting detail: OUTPATIENT SURGERY
Discharge: HOME | End: 2019-12-20
Attending: SURGERY | Admitting: SURGERY
Payer: COMMERCIAL

## 2019-12-20 VITALS
DIASTOLIC BLOOD PRESSURE: 80 MMHG | HEART RATE: 99 BPM | RESPIRATION RATE: 16 BRPM | OXYGEN SATURATION: 99 % | BODY MASS INDEX: 26.48 KG/M2 | SYSTOLIC BLOOD PRESSURE: 120 MMHG | HEIGHT: 70 IN | TEMPERATURE: 97.4 F | WEIGHT: 185 LBS

## 2019-12-20 DIAGNOSIS — R22.2 MASS OF SKIN OF BACK: ICD-10-CM

## 2019-12-20 DIAGNOSIS — R22.2 MASS OF CHEST WALL, RIGHT: ICD-10-CM

## 2019-12-20 PROCEDURE — 88305 TISSUE EXAM BY PATHOLOGIST: CPT | Performed by: PATHOLOGY

## 2019-12-20 PROCEDURE — 21552 EXC NECK LES SC 3 CM/>: CPT | Performed by: PHYSICIAN ASSISTANT

## 2019-12-20 PROCEDURE — 12031 INTMD RPR S/A/T/EXT 2.5 CM/<: CPT | Performed by: SURGERY

## 2019-12-20 PROCEDURE — 88304 TISSUE EXAM BY PATHOLOGIST: CPT | Performed by: PATHOLOGY

## 2019-12-20 PROCEDURE — 99024 POSTOP FOLLOW-UP VISIT: CPT | Performed by: SURGERY

## 2019-12-20 PROCEDURE — 11402 EXC TR-EXT B9+MARG 1.1-2 CM: CPT | Performed by: SURGERY

## 2019-12-20 PROCEDURE — 21552 EXC NECK LES SC 3 CM/>: CPT | Performed by: SURGERY

## 2019-12-20 RX ORDER — FENTANYL CITRATE 50 UG/ML
25 INJECTION, SOLUTION INTRAMUSCULAR; INTRAVENOUS
Status: DISCONTINUED | OUTPATIENT
Start: 2019-12-20 | End: 2019-12-20 | Stop reason: HOSPADM

## 2019-12-20 RX ORDER — FENTANYL CITRATE 50 UG/ML
INJECTION, SOLUTION INTRAMUSCULAR; INTRAVENOUS AS NEEDED
Status: DISCONTINUED | OUTPATIENT
Start: 2019-12-20 | End: 2019-12-20 | Stop reason: SURG

## 2019-12-20 RX ORDER — MAGNESIUM HYDROXIDE 1200 MG/15ML
LIQUID ORAL AS NEEDED
Status: DISCONTINUED | OUTPATIENT
Start: 2019-12-20 | End: 2019-12-20 | Stop reason: HOSPADM

## 2019-12-20 RX ORDER — HYDROCODONE BITARTRATE AND ACETAMINOPHEN 5; 325 MG/1; MG/1
1 TABLET ORAL EVERY 4 HOURS PRN
Status: DISCONTINUED | OUTPATIENT
Start: 2019-12-20 | End: 2019-12-20 | Stop reason: HOSPADM

## 2019-12-20 RX ORDER — CEFAZOLIN SODIUM 1 G/50ML
1000 SOLUTION INTRAVENOUS ONCE
Status: COMPLETED | OUTPATIENT
Start: 2019-12-20 | End: 2019-12-20

## 2019-12-20 RX ORDER — MEPERIDINE HYDROCHLORIDE 50 MG/ML
12.5 INJECTION INTRAMUSCULAR; INTRAVENOUS; SUBCUTANEOUS ONCE AS NEEDED
Status: DISCONTINUED | OUTPATIENT
Start: 2019-12-20 | End: 2019-12-20 | Stop reason: HOSPADM

## 2019-12-20 RX ORDER — MIDAZOLAM HYDROCHLORIDE 2 MG/2ML
INJECTION, SOLUTION INTRAMUSCULAR; INTRAVENOUS AS NEEDED
Status: DISCONTINUED | OUTPATIENT
Start: 2019-12-20 | End: 2019-12-20 | Stop reason: SURG

## 2019-12-20 RX ORDER — ONDANSETRON 2 MG/ML
INJECTION INTRAMUSCULAR; INTRAVENOUS AS NEEDED
Status: DISCONTINUED | OUTPATIENT
Start: 2019-12-20 | End: 2019-12-20 | Stop reason: SURG

## 2019-12-20 RX ORDER — HYDROCODONE BITARTRATE AND ACETAMINOPHEN 5; 325 MG/1; MG/1
1 TABLET ORAL EVERY 4 HOURS PRN
Qty: 8 TABLET | Refills: 0 | Status: SHIPPED | OUTPATIENT
Start: 2019-12-20 | End: 2019-12-31 | Stop reason: ALTCHOICE

## 2019-12-20 RX ORDER — BUPIVACAINE HYDROCHLORIDE 2.5 MG/ML
INJECTION, SOLUTION EPIDURAL; INFILTRATION; INTRACAUDAL AS NEEDED
Status: DISCONTINUED | OUTPATIENT
Start: 2019-12-20 | End: 2019-12-20 | Stop reason: HOSPADM

## 2019-12-20 RX ORDER — HYDROMORPHONE HCL/PF 1 MG/ML
0.5 SYRINGE (ML) INJECTION
Status: DISCONTINUED | OUTPATIENT
Start: 2019-12-20 | End: 2019-12-20 | Stop reason: HOSPADM

## 2019-12-20 RX ORDER — SODIUM CHLORIDE, SODIUM LACTATE, POTASSIUM CHLORIDE, CALCIUM CHLORIDE 600; 310; 30; 20 MG/100ML; MG/100ML; MG/100ML; MG/100ML
125 INJECTION, SOLUTION INTRAVENOUS CONTINUOUS
Status: DISCONTINUED | OUTPATIENT
Start: 2019-12-20 | End: 2019-12-20 | Stop reason: HOSPADM

## 2019-12-20 RX ORDER — SODIUM CHLORIDE 9 MG/ML
125 INJECTION, SOLUTION INTRAVENOUS CONTINUOUS
Status: DISCONTINUED | OUTPATIENT
Start: 2019-12-20 | End: 2019-12-20 | Stop reason: HOSPADM

## 2019-12-20 RX ORDER — ACETAMINOPHEN 325 MG/1
650 TABLET ORAL EVERY 6 HOURS PRN
Status: DISCONTINUED | OUTPATIENT
Start: 2019-12-20 | End: 2019-12-20 | Stop reason: HOSPADM

## 2019-12-20 RX ORDER — DEXAMETHASONE SODIUM PHOSPHATE 4 MG/ML
INJECTION, SOLUTION INTRA-ARTICULAR; INTRALESIONAL; INTRAMUSCULAR; INTRAVENOUS; SOFT TISSUE AS NEEDED
Status: DISCONTINUED | OUTPATIENT
Start: 2019-12-20 | End: 2019-12-20 | Stop reason: SURG

## 2019-12-20 RX ORDER — PROPOFOL 10 MG/ML
INJECTION, EMULSION INTRAVENOUS AS NEEDED
Status: DISCONTINUED | OUTPATIENT
Start: 2019-12-20 | End: 2019-12-20 | Stop reason: SURG

## 2019-12-20 RX ADMIN — FENTANYL CITRATE 50 MCG: 50 INJECTION, SOLUTION INTRAMUSCULAR; INTRAVENOUS at 15:19

## 2019-12-20 RX ADMIN — ACETAMINOPHEN 650 MG: 325 TABLET ORAL at 17:38

## 2019-12-20 RX ADMIN — MIDAZOLAM 1 MG: 1 INJECTION INTRAMUSCULAR; INTRAVENOUS at 15:27

## 2019-12-20 RX ADMIN — CEFAZOLIN SODIUM 1000 MG: 1 SOLUTION INTRAVENOUS at 15:15

## 2019-12-20 RX ADMIN — SODIUM CHLORIDE 125 ML/HR: 0.9 INJECTION, SOLUTION INTRAVENOUS at 14:36

## 2019-12-20 RX ADMIN — SODIUM CHLORIDE: 0.9 INJECTION, SOLUTION INTRAVENOUS at 16:11

## 2019-12-20 RX ADMIN — LIDOCAINE HYDROCHLORIDE 100 MG: 20 INJECTION, SOLUTION INTRAVENOUS at 15:27

## 2019-12-20 RX ADMIN — MIDAZOLAM 1 MG: 1 INJECTION INTRAMUSCULAR; INTRAVENOUS at 15:19

## 2019-12-20 RX ADMIN — FENTANYL CITRATE 50 MCG: 50 INJECTION, SOLUTION INTRAMUSCULAR; INTRAVENOUS at 15:27

## 2019-12-20 RX ADMIN — ONDANSETRON 4 MG: 2 INJECTION INTRAMUSCULAR; INTRAVENOUS at 15:34

## 2019-12-20 RX ADMIN — PHENYLEPHRINE HYDROCHLORIDE 100 MCG: 10 INJECTION INTRAVENOUS at 16:07

## 2019-12-20 RX ADMIN — PROPOFOL 200 MG: 10 INJECTION, EMULSION INTRAVENOUS at 15:27

## 2019-12-20 RX ADMIN — DEXAMETHASONE SODIUM PHOSPHATE 4 MG: 4 INJECTION, SOLUTION INTRAMUSCULAR; INTRAVENOUS at 15:34

## 2019-12-20 NOTE — ANESTHESIA PREPROCEDURE EVALUATION
Review of Systems/Medical History          Cardiovascular  Hyperlipidemia,    Pulmonary    Comment: TMJ     GI/Hepatic    GERD ,             Endo/Other  Negative endo/other ROS      GYN       Hematology  Negative hematology ROS      Musculoskeletal  Negative musculoskeletal ROS        Neurology   Psychology   Anxiety,              Physical Exam    Airway  Comment: TMJ, clicks, sometimes locks  Mallampati score: I  TM Distance: >3 FB  Neck ROM: full     Dental       Cardiovascular  Cardiovascular exam normal    Pulmonary  Pulmonary exam normal     Other Findings        Anesthesia Plan  ASA Score- 2     Anesthesia Type- general with ASA Monitors  Additional Monitors:   Airway Plan:         Plan Factors-    Induction- intravenous  Postoperative Plan-     Informed Consent- Anesthetic plan and risks discussed with patient

## 2019-12-20 NOTE — PROGRESS NOTES
After IV insertion patient complained of feeling faint  He became diaphoretic and pale  He maintained conciousness  Initial blood pressure=78/53, HR=48  It quickly improved by lowering his head and raising his legs  He reports feeling better  Blood pressure=110/69  HR=83  Continuing to monitor

## 2019-12-20 NOTE — H&P
History & Physical    Genaro Hendricks    47 y o   male  [id number]  Memo Montes MD  Date: December 20, 2019    Assessment:  Patient Active Problem List   Diagnosis    Cervical radiculopathy    Chronic right shoulder pain    Herniated nucleus pulposus, C4-5 left    Hypercholesterolemia    Insomnia    Seasonal allergies    Vitamin D deficiency    Acid reflux    Overweight (BMI 25 0-29  9)    Family history of heart disease    Encounter for annual physical exam    Mass of chest wall, right    Mass of skin of back     Plan:  Genaro Hendricks is scheduled for right chest wall mass and back skin lesion    HPI    Historical Information   Past Medical History:   Diagnosis Date    Anxiety     Cervical spinal stenosis     Hyperlipidemia     Seasonal allergies     Tinnitus     left ear    TMJ click      Past Surgical History:   Procedure Laterality Date    OTHER SURGICAL HISTORY      Gum graft     VASECTOMY      WISDOM TOOTH EXTRACTION       Social History   Social History     Substance and Sexual Activity   Alcohol Use Yes    Frequency: Monthly or less    Comment: 1 weekend/yr     Social History     Substance and Sexual Activity   Drug Use No     Social History     Tobacco Use   Smoking Status Never Smoker   Smokeless Tobacco Never Used     Family History   Problem Relation Age of Onset    Cancer Mother         Possibly ovarian     Other Father         Premature coronary heart disease    Hypertension Father     Other Family         Premature Coronary Heart Disease    Other Family         Multiple Allergies    Rheum arthritis Brother         Meds/Allergies   No Known Allergies    Current Facility-Administered Medications:     ceFAZolin (ANCEF) IVPB (premix) 1,000 mg, 1,000 mg, Intravenous, Once, Óscar Giordano MD    lactated ringers infusion, 125 mL/hr, Intravenous, Continuous, Óscar Giordano MD    sodium chloride 0 9 % infusion, 125 mL/hr, Intravenous, Continuous, Kerri South DO, Last Rate: 125 mL/hr at 12/20/19 1436, 125 mL/hr at 12/20/19 1436    Review of Systems    Vitals:    12/20/19 1427   BP: 124/82   Pulse: 76   Resp: 16   Temp: 98 2 °F (36 8 °C)   SpO2: 96%     Physical Exam  GEN: NAD, A+OX3   HEENT: Normocephalic, atraumatic,   NECK: Supple, trachea midline,   CARDIAC: regular rate & rhythm, S1 & S2 normal    LUNGS: Clear to auscultation, No Wheeze, Rales, or Rhonchi  ABDOMEN:soft, NT    EXTREMITIES: No evidence of cyanosis, clubbing or edema  Pulses +2 B/L LE  NEURO: CN II-XII intact grossly, No sensory or motor deficits    Lab Results: I have personally reviewed pertinent lab results  Imaging: I have personally reviewed pertinent reports  EKG, Pathology, and Other Studies: I have personally reviewed pertinent reports      Lab Results   Component Value Date    GLUCOSE 81 08/11/2014    CALCIUM 9 1 12/12/2019     02/29/2016    K 4 1 12/12/2019    CO2 29 12/12/2019     12/12/2019    BUN 19 12/12/2019    CREATININE 0 92 12/12/2019     Lab Results   Component Value Date    WBC 7 20 12/12/2019    HGB 14 2 12/12/2019    HCT 44 0 12/12/2019    MCV 90 12/12/2019     12/12/2019     Lab Results   Component Value Date    ALT 48 12/12/2019    AST 19 12/12/2019    ALKPHOS 100 12/12/2019    BILITOT 0 46 08/11/2014

## 2019-12-20 NOTE — OP NOTE
OPERATIVE REPORT  PATIENT NAME: Buffy Collier    :  1970  MRN: [id number]  Pt Location: [facility] OR ROOM 02    SURGERY DATE: 2019    Surgeon(s) and Role:     * Bebe Antonio MD - Primary     *Caro Sanchez PA-C - Assisting    Preop Diagnosis:  Mass of chest wall, right [R22 2]  Mass of skin of back [R22 2]    Post-Op Diagnosis Codes:     * Mass of chest wall, right [R22 2]     * Mass of skin of back [R22 2]    Procedure(s) (LRB):  EXC Bx MASS LT BACK & RT CHEST (N/A)    Specimen(s):  ID Type Source Tests Collected by Time Destination   1 : Right chest wall mass 8X5 cm Tissue Soft Tissue, Other TISSUE EXAM Bebe Antonio MD 2019 1555    2 : Left back skin lesion 2X1cm Tissue Soft Tissue, Other TISSUE EXAM Bebe Antonio MD 2019 1610        Estimated Blood Loss:   Minimal    Drains:  * No LDAs found *    Anesthesia Type:   IV Sedation with Anesthesia    Operative Indications: Mass of chest wall, right [R22 2]  Mass of skin of back [R22 2]      Operative Findings:  Right chest wall mass measuring 8 x 5 x 2 cm  Left back wall skin lesion measuring about 2 x 1 cm    Complications:   None    Procedure and Technique:  The patient was seen again in the Holding Room  The risks, benefits, complications, treatment options, and expected outcomes were discussed with the patient  The patient and/or family concurred with the proposed plan, giving informed consent  The site of surgery properly noted/marked  The patient was taken to Operating Room, identified as Buffy Collier  and the procedure verified  A Time Out was held after prepping and draping in sterile fashion  The above information was confirmed  The right chest wall was prepped and draped in sterile fashion  0 25% plain was used for and local anesthesia  Incision made with 15 blade scalpel  Subcu tissue dissected Bovie cautery    The subcutaneous mass was dissected free a combination of blunt dissection and sharp dissection and cautery as necessary  The mass excised and sent to pathology  It measured about 8 x 5 x 2 cm  There is good hemostasis  The wounds closed interrupted 3 0 Vicryl followed by 4 Monocryl and glue  Patient was then rolled onto his right side and the back was re-prepped  An elliptical incision was made about to have cm long around the skin lesion  The tissue dissected down to the subcutaneous tissue with Bovie cautery  The mass remove this entire inside pathology  This good hemostasis  The wounds closed interrupted 0 Vicryl followed by 4 Monocryl and glue         I was present for the entire procedure, A qualified resident physician was not available and A physician assistant was required during the procedure for retraction tissue handling,dissection and suturing    Patient Disposition:  [facility]    SIGNATURE: Ines Mays MD  DATE: December 20, 2019  TIME: 4:16 PM

## 2019-12-23 ENCOUNTER — TELEPHONE (OUTPATIENT)
Dept: SURGERY | Facility: HOSPITAL | Age: 49
End: 2019-12-23

## 2019-12-23 NOTE — TELEPHONE ENCOUNTER
Two day post op call  Left message for patient  Asked how patient was doing, to call if there were any questions or concerns  Also reminded patient of post op appointment and to call if any questions or concerns arise prior to post op appointment

## 2019-12-31 ENCOUNTER — OFFICE VISIT (OUTPATIENT)
Dept: SURGERY | Facility: MEDICAL CENTER | Age: 49
End: 2019-12-31

## 2019-12-31 VITALS — BODY MASS INDEX: 26.4 KG/M2 | TEMPERATURE: 96 F | HEIGHT: 70 IN | WEIGHT: 184.4 LBS

## 2019-12-31 DIAGNOSIS — Z98.890 STATUS POST EXCISION OF LIPOMA: Primary | ICD-10-CM

## 2019-12-31 DIAGNOSIS — Z86.018 STATUS POST EXCISION OF LIPOMA: Primary | ICD-10-CM

## 2019-12-31 PROCEDURE — 99024 POSTOP FOLLOW-UP VISIT: CPT | Performed by: SURGERY

## 2019-12-31 NOTE — PROGRESS NOTES
Assessment/Plan: Claire Barba is a 47year old male who presents today status post  subcutaneous mass and skin lesion excision performed on 12/20/19  Patient is doing well after the procedure  He has no restrictions at this time  I will call the patient back when his pathology results are done  He may follow up as needed  He knows to contact the office if any questions or concerns arise  No problem-specific Assessment & Plan notes found for this encounter  Diagnoses and all orders for this visit:    Status post excision of lipoma          Subjective:      Patient ID: Vicki Awad is a 47 y o  male  Claire Barba is a 47year old male who presents today status post  subcutaneous mass and skin lesion excision performed on 12/20/19  Patient states that he is doing well, he denies any pain  He says that sometimes he has some mild discomfort when he is sleeping but otherwise he is feeling well  The following portions of the patient's history were reviewed and updated as appropriate: allergies, current medications, past family history, past medical history, past social history, past surgical history and problem list     Review of Systems      Objective:      Temp (!) 96 °F (35 6 °C) (Tympanic)   Ht 5' 10" (1 778 m)   Wt 83 6 kg (184 lb 6 4 oz)   BMI 26 46 kg/m²          Physical Exam   Skin:   Incisions are healing well          By signing my name below, I, Bettyann Burkitt, attest that this documentation has been prepared under the direction and in the presence of Kerrie Yuan MD  Electronically Signed: Bettyann Burkitt, Scribe  12/31/19  Ho Londono, personally performed the services described in this documentation  All medical record entries made by the scribe were at my direction and in my presence  I have reviewed the chart and discharge instructions and agree that the record reflects my personal performance and is accurate and complete  Kerrie Yuan MD  12/31/19

## 2020-01-06 PROBLEM — M19.90 INFLAMMATORY ARTHRITIS: Status: ACTIVE | Noted: 2020-01-06

## 2020-01-16 ENCOUNTER — OFFICE VISIT (OUTPATIENT)
Dept: FAMILY MEDICINE CLINIC | Facility: CLINIC | Age: 50
End: 2020-01-16
Payer: COMMERCIAL

## 2020-01-16 VITALS
HEIGHT: 70 IN | OXYGEN SATURATION: 96 % | WEIGHT: 184.6 LBS | RESPIRATION RATE: 18 BRPM | SYSTOLIC BLOOD PRESSURE: 130 MMHG | DIASTOLIC BLOOD PRESSURE: 80 MMHG | BODY MASS INDEX: 26.43 KG/M2 | TEMPERATURE: 99.1 F | HEART RATE: 90 BPM

## 2020-01-16 DIAGNOSIS — J01.10 ACUTE FRONTAL SINUSITIS, RECURRENCE NOT SPECIFIED: Primary | ICD-10-CM

## 2020-01-16 PROCEDURE — 99212 OFFICE O/P EST SF 10 MIN: CPT | Performed by: INTERNAL MEDICINE

## 2020-01-16 RX ORDER — AMOXICILLIN AND CLAVULANATE POTASSIUM 875; 125 MG/1; MG/1
1 TABLET, FILM COATED ORAL EVERY 12 HOURS SCHEDULED
Qty: 20 TABLET | Refills: 0 | Status: SHIPPED | OUTPATIENT
Start: 2020-01-16 | End: 2020-01-26

## 2020-01-16 NOTE — PROGRESS NOTES
Assessment/Plan:     Diagnoses and all orders for this visit:    Acute frontal sinusitis, recurrence not specified  -     amoxicillin-clavulanate (AUGMENTIN) 875-125 mg per tablet; Take 1 tablet by mouth every 12 (twelve) hours for 10 days       Discussed symptoms and will treat with the above regime  Otherwise no other changes were made  Subjective:      Patient ID: Johanny Potter is a 47 y o  male  Ji Lugo is here today with complaints of continued sinusitis symptoms that started about 2 weeks ago  Sick contacts include household members  URI    This is a new problem  The current episode started 1 to 4 weeks ago  There has been no fever  Associated symptoms include coughing, ear pain, headaches, sinus pain and a sore throat  Pertinent negatives include no abdominal pain, chest pain, nausea, plugged ear sensation, rhinorrhea, swollen glands, vomiting or wheezing  The following portions of the patient's history were reviewed and updated as appropriate: allergies, current medications, past family history, past medical history, past social history, past surgical history and problem list     Review of Systems   HENT: Positive for ear pain, postnasal drip, sinus pain and sore throat  Negative for rhinorrhea  Respiratory: Positive for cough  Negative for wheezing  Cardiovascular: Negative for chest pain and palpitations  Gastrointestinal: Negative for abdominal pain, nausea and vomiting  Neurological: Positive for headaches  Objective:      /80   Pulse 90   Temp 99 1 °F (37 3 °C)   Resp 18   Ht 5' 10" (1 778 m)   Wt 83 7 kg (184 lb 9 6 oz)   SpO2 96%   BMI 26 49 kg/m²          Physical Exam   Constitutional: He is oriented to person, place, and time  He appears well-developed and well-nourished  No distress  HENT:   Head: Normocephalic and atraumatic  Right Ear: External ear normal    Left Ear: External ear normal    Nose: Mucosal edema present   Right sinus exhibits frontal sinus tenderness  Left sinus exhibits frontal sinus tenderness  Mouth/Throat: Oropharynx is clear and moist    Eyes: Conjunctivae and EOM are normal  Right eye exhibits no discharge  Left eye exhibits no discharge  No scleral icterus  Neck: Normal range of motion  Cardiovascular: Normal rate, regular rhythm and normal heart sounds  No murmur heard  Pulmonary/Chest: Effort normal and breath sounds normal  No respiratory distress  He has no wheezes  Musculoskeletal: Normal range of motion  Lymphadenopathy:     He has no cervical adenopathy  Neurological: He is alert and oriented to person, place, and time  Skin: Skin is warm and dry  He is not diaphoretic  Psychiatric: He has a normal mood and affect  His speech is normal and behavior is normal  Judgment and thought content normal    Vitals reviewed

## 2020-01-23 ENCOUNTER — TELEPHONE (OUTPATIENT)
Dept: FAMILY MEDICINE CLINIC | Facility: CLINIC | Age: 50
End: 2020-01-23

## 2020-01-23 NOTE — TELEPHONE ENCOUNTER
Pt called the office today and mentioned his ENT office does not accept his insurance anymore  They are not able to give him his allergy shots which he gets once every 3 weeks  He is due for his next one in a week and has 4 remaining at the ENT office  He was wondering if he could bring them here for us to give to him  He said we would need to have a prescription on hand for prednisone in case he were to have a reaction to one of the shots  I told him that is not something we typically do here but I would look into it for him  He mentioned he is searching for a new ENT who accepts his insurance as he is not in a rush to get the next shot   Please advise

## 2020-02-04 NOTE — TELEPHONE ENCOUNTER
Unfortunately the decision was made a few years ago to discontinue doing allergy shots here in the office    I left a message for Whitney Blanc in regards to this and informed him that he will need to have done by an allergist

## 2020-03-21 ENCOUNTER — OFFICE VISIT (OUTPATIENT)
Dept: URGENT CARE | Facility: MEDICAL CENTER | Age: 50
End: 2020-03-21
Payer: COMMERCIAL

## 2020-03-21 VITALS
DIASTOLIC BLOOD PRESSURE: 84 MMHG | HEIGHT: 70 IN | OXYGEN SATURATION: 98 % | RESPIRATION RATE: 16 BRPM | WEIGHT: 182 LBS | TEMPERATURE: 97.2 F | HEART RATE: 90 BPM | SYSTOLIC BLOOD PRESSURE: 130 MMHG | BODY MASS INDEX: 26.05 KG/M2

## 2020-03-21 DIAGNOSIS — J01.00 ACUTE NON-RECURRENT MAXILLARY SINUSITIS: Primary | ICD-10-CM

## 2020-03-21 PROCEDURE — G0382 LEV 3 HOSP TYPE B ED VISIT: HCPCS | Performed by: PHYSICIAN ASSISTANT

## 2020-03-21 PROCEDURE — 99203 OFFICE O/P NEW LOW 30 MIN: CPT | Performed by: PHYSICIAN ASSISTANT

## 2020-03-21 PROCEDURE — 99283 EMERGENCY DEPT VISIT LOW MDM: CPT | Performed by: PHYSICIAN ASSISTANT

## 2020-03-21 RX ORDER — EPINASTINE HCL 0.05 %
DROPS OPHTHALMIC (EYE)
COMMUNITY
Start: 2020-02-18

## 2020-03-21 RX ORDER — AMOXICILLIN AND CLAVULANATE POTASSIUM 875; 125 MG/1; MG/1
1 TABLET, FILM COATED ORAL EVERY 12 HOURS SCHEDULED
Qty: 20 TABLET | Refills: 0 | Status: SHIPPED | OUTPATIENT
Start: 2020-03-21 | End: 2020-03-31

## 2020-03-21 RX ORDER — AMOXICILLIN AND CLAVULANATE POTASSIUM 875; 125 MG/1; MG/1
1 TABLET, FILM COATED ORAL EVERY 12 HOURS SCHEDULED
Qty: 14 TABLET | Refills: 0 | Status: SHIPPED | OUTPATIENT
Start: 2020-03-21 | End: 2020-03-21

## 2020-03-21 NOTE — PROGRESS NOTES
[facility]        NAME: Alexis Moore is a 47 y o  male  : 1970    MRN: [id number]  DATE: 2020  TIME: 2:07 PM    Assessment and Plan   Acute non-recurrent maxillary sinusitis [J01 00]  1  Acute non-recurrent maxillary sinusitis  amoxicillin-clavulanate (AUGMENTIN) 875-125 mg per tablet    DISCONTINUED: amoxicillin-clavulanate (AUGMENTIN) 875-125 mg per tablet         Patient Instructions     Take Augmentin  One tablet twice a day for the next 10 days  Follow-up with PCP in 3-5 days if symptoms do not resolve  Go to the ER if symptoms become severe  Chief Complaint     Chief Complaint   Patient presents with    Sinusitis     Patient presents with sinus pressure and pain since last Saturday  He reports pain under the eyes and in the back of his mouth  History of Present Illness       Sinusitis   This is a new problem  The current episode started 1 to 4 weeks ago  The problem has been gradually worsening (pressure is continuing and worsening) since onset  There has been no fever  Associated symptoms include coughing (due to PND when laying down at night), sinus pressure and a sore throat (due to PND at night)  Pertinent negatives include no chills, congestion, diaphoresis, ear pain, headaches, neck pain or shortness of breath  (+teeth pain, rhinorrhea    ) Treatments tried: saline nasal spray, azelastine  The treatment provided mild (azelastine works well for nasal congestion) relief  Review of Systems   Review of Systems   Constitutional: Negative for activity change, appetite change, chills, diaphoresis, fatigue and fever  HENT: Positive for sinus pressure and sore throat (due to PND at night)  Negative for congestion, ear discharge, ear pain, hearing loss, postnasal drip, rhinorrhea, sinus pain and trouble swallowing  Eyes: Negative for pain, discharge, redness and itching  Respiratory: Positive for cough (due to PND when laying down at night)   Negative for chest tightness, shortness of breath, wheezing and stridor  Cardiovascular: Negative for chest pain, palpitations and leg swelling  Gastrointestinal: Negative for abdominal distention, abdominal pain, diarrhea, nausea and vomiting  Musculoskeletal: Negative for arthralgias, myalgias, neck pain and neck stiffness  Skin: Negative for color change, pallor and rash  Neurological: Negative for dizziness, syncope, weakness, light-headedness, numbness and headaches           Current Medications       Current Outpatient Medications:     albuterol (PROVENTIL HFA,VENTOLIN HFA) 90 mcg/act inhaler, Inhale 2 puffs every 6 (six) hours as needed, Disp: , Rfl:     ALPRAZolam (XANAX) 1 mg tablet, Take 1 tablet (1 mg total) by mouth daily at bedtime as needed for anxiety, Disp: 30 tablet, Rfl: 1    atorvastatin (LIPITOR) 40 mg tablet, Take 1 tablet (40 mg total) by mouth daily, Disp: 90 tablet, Rfl: 1    azelastine (ASTELIN) 0 1 % nasal spray, 1 spray into each nostril 2 (two) times a day as needed Use in each nostril as directed , Disp: , Rfl:     fluticasone (FLOVENT HFA) 110 MCG/ACT inhaler, Inhale 2 puffs 2 (two) times a day as needed Rinse mouth after use , Disp: , Rfl:     loratadine (CLARITIN) 10 mg tablet, Take 10 mg by mouth daily as needed , Disp: , Rfl:     zolpidem (AMBIEN) 5 mg tablet, Take 1 tablet (5 mg total) by mouth daily at bedtime as needed for sleep, Disp: 30 tablet, Rfl: 2    amoxicillin-clavulanate (AUGMENTIN) 875-125 mg per tablet, Take 1 tablet by mouth every 12 (twelve) hours for 10 days, Disp: 20 tablet, Rfl: 0    Epinastine HCl 0 05 % ophthalmic solution, INSTILL 1 DROP INTO BOTH EYES 4 TIMES A DAY AS NEEDED, Disp: , Rfl:     naproxen (NAPROSYN) 500 mg tablet, Take 1 tablet (500 mg total) by mouth 2 (two) times a day with meals (Patient not taking: Reported on 3/21/2020), Disp: 60 tablet, Rfl: 3    Current Allergies     Allergies as of 03/21/2020    (No Known Allergies)            The following portions of the patient's history were reviewed and updated as appropriate: allergies, current medications, past family history, past medical history, past social history, past surgical history and problem list      Past Medical History:   Diagnosis Date    Anxiety     Cervical spinal stenosis     Hyperlipidemia     Seasonal allergies     Tinnitus     left ear    TMJ click        Past Surgical History:   Procedure Laterality Date    OTHER SURGICAL HISTORY      Gum graft     [place] EXC SKIN BENIG 1 1-2 CM TRUNK,ARM,LEG N/A 12/20/2019    Procedure: EXC Bx MASS LT BACK & RT CHEST;  Surgeon: Rip Murcia MD;  Location: [facility];  Service: General    VASECTOMY      WISDOM TOOTH EXTRACTION         Family History   Problem Relation Age of Onset    Cancer Mother         Possibly ovarian     Other Father         Premature coronary heart disease    Hypertension Father     Other Family         Premature Coronary Heart Disease    Other Family         Multiple Allergies    Rheum arthritis Brother          Medications have been verified  Objective   /84   Pulse 90   Temp (!) 97 2 °F (36 2 °C) (Tympanic)   Resp 16   Ht 5' 10" (1 778 m)   Wt 82 6 kg (182 lb)   SpO2 98%   BMI 26 11 kg/m²        Physical Exam     Physical Exam   Constitutional: He is oriented to person, place, and time  He appears well-developed and well-nourished  No distress  HENT:   Head: Normocephalic and atraumatic  Right Ear: External ear normal    Left Ear: External ear normal    Nose: Right sinus exhibits maxillary sinus tenderness  Left sinus exhibits maxillary sinus tenderness  Mouth/Throat: Oropharynx is clear and moist  No oropharyngeal exudate, posterior oropharyngeal edema or posterior oropharyngeal erythema  Neck: Normal range of motion  Neck supple  Cardiovascular: Normal rate, regular rhythm, normal heart sounds and intact distal pulses  No murmur heard    Pulmonary/Chest: Effort normal and breath sounds normal  No stridor  No respiratory distress  He has no wheezes  Lymphadenopathy:     He has no cervical adenopathy  Neurological: He is alert and oriented to person, place, and time  Skin: Skin is warm  No rash noted  He is not diaphoretic  Psychiatric: He has a normal mood and affect  His behavior is normal    Nursing note and vitals reviewed

## 2020-03-21 NOTE — PATIENT INSTRUCTIONS
Take Augmentin  One tablet twice a day for the next 10 days  Follow-up with PCP in 3-5 days if symptoms do not resolve  Go to the ER if symptoms become severe  Sinusitis   WHAT YOU NEED TO KNOW:   Sinusitis is inflammation or infection of your sinuses  It is most often caused by a virus  Acute sinusitis may last up to 12 weeks  Chronic sinusitis lasts longer than 12 weeks  Recurrent sinusitis means you have 4 or more times in 1 year  DISCHARGE INSTRUCTIONS:   Return to the emergency department if:   · Your eye and eyelid are red, swollen, and painful  · You cannot open your eye  · You have vision changes, such as double vision  · Your eyeball bulges out or you cannot move your eye  · You are more sleepy than normal, or you notice changes in your ability to think, move, or talk  · You have a stiff neck, a fever, or a bad headache  · You have swelling of your forehead or scalp  Contact your healthcare provider if:   · Your symptoms do not improve after 3 days  · Your symptoms do not go away after 10 days  · You have nausea and are vomiting  · Your nose is bleeding  · You have questions or concerns about your condition or care  Medicines: Your symptoms may go away on their own  Your healthcare provider may recommend watchful waiting for up to 10 days before starting antibiotics  You may  need any of the following:  · Acetaminophen  decreases pain and fever  It is available without a doctor's order  Ask how much to take and how often to take it  Follow directions  Read the labels of all other medicines you are using to see if they also contain acetaminophen, or ask your doctor or pharmacist  Acetaminophen can cause liver damage if not taken correctly  Do not use more than 4 grams (4,000 milligrams) total of acetaminophen in one day  · NSAIDs , such as ibuprofen, help decrease swelling, pain, and fever  This medicine is available with or without a doctor's order   NSAIDs can cause stomach bleeding or kidney problems in certain people  If you take blood thinner medicine, always ask your healthcare provider if NSAIDs are safe for you  Always read the medicine label and follow directions  · Nasal steroid sprays  may help decrease inflammation in your nose and sinuses  · Decongestants  help reduce swelling and drain mucus in the nose and sinuses  They may help you breathe easier  · Antihistamines  help dry mucus in the nose and relieve sneezing  · Antibiotics  help treat or prevent a bacterial infection  · Take your medicine as directed  Contact your healthcare provider if you think your medicine is not helping or if you have side effects  Tell him or her if you are allergic to any medicine  Keep a list of the medicines, vitamins, and herbs you take  Include the amounts, and when and why you take them  Bring the list or the pill bottles to follow-up visits  Carry your medicine list with you in case of an emergency  Self-care:   · Rinse your sinuses  Use a sinus rinse device to rinse your nasal passages with a saline (salt water) solution or distilled water  Do not use tap water  This will help thin the mucus in your nose and rinse away pollen and dirt  It will also help reduce swelling so you can breathe normally  Ask your healthcare provider how often to do this  · Breathe in steam   Heat a bowl of water until you see steam  Lean over the bowl and make a tent over your head with a large towel  Breathe deeply for about 20 minutes  Be careful not to get too close to the steam or burn yourself  Do this 3 times a day  You can also breathe deeply when you take a hot shower  · Sleep with your head elevated  Place an extra pillow under your head before you go to sleep to help your sinuses drain  · Drink liquids as directed  Ask your healthcare provider how much liquid to drink each day and which liquids are best for you   Liquids will thin the mucus in your nose and help it drain  Avoid drinks that contain alcohol or caffeine  · Do not smoke, and avoid secondhand smoke  Nicotine and other chemicals in cigarettes and cigars can make your symptoms worse  Ask your healthcare provider for information if you currently smoke and need help to quit  E-cigarettes or smokeless tobacco still contain nicotine  Talk to your healthcare provider before you use these products  Prevent the spread of germs that cause sinusitis:  Wash your hands often with soap and water  Wash your hands after you use the bathroom, change a child's diaper, or sneeze  Wash your hands before you prepare or eat food  Follow up with your healthcare provider as directed: You may be referred to an ear, nose, and throat specialist  Write down your questions so you remember to ask them during your visits  © 2017 [employer]  Information is for End User's use only and may not be sold, redistributed or otherwise used for commercial purposes  All illustrations and images included in CareNotes® are the copyrighted property of [employer]  or [employer]  The above information is an  only  It is not intended as medical advice for individual conditions or treatments  Talk to your doctor, nurse or pharmacist before following any medical regimen to see if it is safe and effective for you

## 2020-04-08 ENCOUNTER — TELEPHONE (OUTPATIENT)
Dept: GASTROENTEROLOGY | Facility: AMBULARY SURGERY CENTER | Age: 50
End: 2020-04-08

## 2020-05-20 ENCOUNTER — TELEMEDICINE (OUTPATIENT)
Dept: FAMILY MEDICINE CLINIC | Facility: CLINIC | Age: 50
End: 2020-05-20
Payer: COMMERCIAL

## 2020-05-20 VITALS — HEIGHT: 70 IN | BODY MASS INDEX: 25.77 KG/M2 | WEIGHT: 180 LBS

## 2020-05-20 DIAGNOSIS — J01.00 ACUTE MAXILLARY SINUSITIS, RECURRENCE NOT SPECIFIED: Primary | ICD-10-CM

## 2020-05-20 PROCEDURE — 99213 OFFICE O/P EST LOW 20 MIN: CPT | Performed by: INTERNAL MEDICINE

## 2020-05-20 RX ORDER — AMOXICILLIN AND CLAVULANATE POTASSIUM 875; 125 MG/1; MG/1
1 TABLET, FILM COATED ORAL EVERY 12 HOURS SCHEDULED
Qty: 20 TABLET | Refills: 0 | Status: SHIPPED | OUTPATIENT
Start: 2020-05-20 | End: 2020-05-30

## 2020-07-21 ENCOUNTER — OFFICE VISIT (OUTPATIENT)
Dept: FAMILY MEDICINE CLINIC | Facility: CLINIC | Age: 50
End: 2020-07-21
Payer: COMMERCIAL

## 2020-07-21 VITALS
OXYGEN SATURATION: 97 % | DIASTOLIC BLOOD PRESSURE: 80 MMHG | WEIGHT: 184.2 LBS | HEART RATE: 76 BPM | RESPIRATION RATE: 18 BRPM | TEMPERATURE: 98 F | SYSTOLIC BLOOD PRESSURE: 128 MMHG | BODY MASS INDEX: 26.37 KG/M2 | HEIGHT: 70 IN

## 2020-07-21 DIAGNOSIS — H91.8X2 OTHER SPECIFIED HEARING LOSS OF LEFT EAR, UNSPECIFIED HEARING STATUS ON CONTRALATERAL SIDE: ICD-10-CM

## 2020-07-21 DIAGNOSIS — H93.12 TINNITUS OF LEFT EAR: Primary | ICD-10-CM

## 2020-07-21 PROCEDURE — 3008F BODY MASS INDEX DOCD: CPT | Performed by: INTERNAL MEDICINE

## 2020-07-21 PROCEDURE — 99213 OFFICE O/P EST LOW 20 MIN: CPT | Performed by: INTERNAL MEDICINE

## 2020-07-21 PROCEDURE — 1036F TOBACCO NON-USER: CPT | Performed by: INTERNAL MEDICINE

## 2020-07-21 NOTE — PROGRESS NOTES
Assessment/Plan:     1  Tinnitus of left ear  -     Ambulatory Referral to Otolaryngology; Future    2  Other specified hearing loss of left ear, unspecified hearing status on contralateral side  -     Ambulatory Referral to Otolaryngology; Future      Given his continued symptoms, I would like him to see ENT for another evaluation  We discussed imaging as well as he has not had any thus far  He will let me know when his next appointment is and based on this, we will discuss to image before  Otherwise no other changes were made  Subjective:      Patient ID: Cheryl Del Rio is a 45 y o  male  Stanford Stu is being seen today for continued left ear symptoms  This started over 1 year ago where he noted somewhat pulsatile tinnitus and muffled hearing  He was seen by ENT in the past as well (last visit in 4/2019)  Recently, he reports using a power drill of some sort for about 20 seconds and feels that his hearing/symptoms have worsened  This is about 2 weeks ago  He first noticed hearing loss  He now feels continued tinnitus with muffled hearing and bothersome symptoms when he is trying to listen         The following portions of the patient's history were reviewed and updated as appropriate: allergies, past family history, past medical history, past social history, past surgical history and problem list     Current Outpatient Medications:     albuterol (PROVENTIL HFA,VENTOLIN HFA) 90 mcg/act inhaler, Inhale 2 puffs every 6 (six) hours as needed, Disp: , Rfl:     atorvastatin (LIPITOR) 40 mg tablet, Take 1 tablet (40 mg total) by mouth daily, Disp: 90 tablet, Rfl: 1    azelastine (ASTELIN) 0 1 % nasal spray, 1 spray into each nostril 2 (two) times a day as needed Use in each nostril as directed , Disp: , Rfl:     Epinastine HCl 0 05 % ophthalmic solution, INSTILL 1 DROP INTO BOTH EYES 4 TIMES A DAY AS NEEDED, Disp: , Rfl:     loratadine (CLARITIN) 10 mg tablet, Take 10 mg by mouth daily as needed , Disp: , Rfl:    zolpidem (AMBIEN) 5 mg tablet, Take 1 tablet (5 mg total) by mouth daily at bedtime as needed for sleep, Disp: 30 tablet, Rfl: 2    fluticasone (FLOVENT HFA) 110 MCG/ACT inhaler, Inhale 2 puffs 2 (two) times a day as needed Rinse mouth after use , Disp: , Rfl:     naproxen (NAPROSYN) 500 mg tablet, Take 1 tablet (500 mg total) by mouth 2 (two) times a day with meals (Patient not taking: Reported on 3/21/2020), Disp: 60 tablet, Rfl: 3    Review of Systems   Constitutional: Negative for chills, fever and unexpected weight change  HENT: Positive for ear pain, hearing loss, postnasal drip and tinnitus  Negative for ear discharge  Mild congestion    Respiratory: Negative for cough, chest tightness and shortness of breath  Cardiovascular: Negative for chest pain and palpitations  Gastrointestinal: Negative for abdominal pain, diarrhea, nausea and vomiting  Neurological: Negative for dizziness, numbness and headaches  Psychiatric/Behavioral: Negative for dysphoric mood and sleep disturbance  The patient is not nervous/anxious  Objective:      /80   Pulse 76   Temp 98 °F (36 7 °C)   Resp 18   Ht 5' 10" (1 778 m)   Wt 83 6 kg (184 lb 3 2 oz)   SpO2 97%   BMI 26 43 kg/m²          Physical Exam   Constitutional: He is oriented to person, place, and time  He appears well-developed and well-nourished  No distress  HENT:   Head: Normocephalic and atraumatic  Right Ear: External ear normal    Left Ear: External ear normal    Mild possible middle ear effusion of the left without signs of trauma/infection    Eyes: Conjunctivae and EOM are normal  Right eye exhibits no discharge  Left eye exhibits no discharge  No scleral icterus  Neck: Normal range of motion  Cardiovascular: Normal rate, regular rhythm and normal heart sounds  No murmur heard  Pulmonary/Chest: Effort normal and breath sounds normal  No respiratory distress  He has no wheezes     Musculoskeletal: Normal range of motion  Lymphadenopathy:     He has no cervical adenopathy  Neurological: He is alert and oriented to person, place, and time  Skin: Skin is warm and dry  He is not diaphoretic  Psychiatric: He has a normal mood and affect  His speech is normal and behavior is normal  Judgment and thought content normal    Vitals reviewed

## 2020-07-24 ENCOUNTER — CONSULT (OUTPATIENT)
Dept: MULTI SPECIALTY CLINIC | Facility: CLINIC | Age: 50
End: 2020-07-24

## 2020-07-24 VITALS
HEIGHT: 70 IN | DIASTOLIC BLOOD PRESSURE: 88 MMHG | BODY MASS INDEX: 26.45 KG/M2 | WEIGHT: 184.75 LBS | SYSTOLIC BLOOD PRESSURE: 124 MMHG | TEMPERATURE: 96.7 F | HEART RATE: 86 BPM

## 2020-07-24 DIAGNOSIS — H93.12 TINNITUS OF LEFT EAR: Primary | ICD-10-CM

## 2020-07-24 DIAGNOSIS — H91.8X2 OTHER SPECIFIED HEARING LOSS OF LEFT EAR, UNSPECIFIED HEARING STATUS ON CONTRALATERAL SIDE: ICD-10-CM

## 2020-07-24 DIAGNOSIS — H65.02 NON-RECURRENT ACUTE SEROUS OTITIS MEDIA OF LEFT EAR: ICD-10-CM

## 2020-07-24 PROCEDURE — 99243 OFF/OP CNSLTJ NEW/EST LOW 30: CPT | Performed by: OTOLARYNGOLOGY

## 2020-07-24 NOTE — PROGRESS NOTES
Consultation - Otolaryngology - Head and Neck Surgery  Facial Plastic and Reconstructive Surgery  Yoseph Paul 45 y o  male MRN: [id number]  Encounter: [id number]        Assessment/Plan:  1  Tinnitus of left ear  Ambulatory Referral to Otolaryngology    Ambulatory referral to Audiology   2  Other specified hearing loss of left ear, unspecified hearing status on contralateral side  Ambulatory Referral to Otolaryngology    Ambulatory referral to Audiology   3  Non-recurrent acute serous otitis media of left ear           On exam, 512 hz tuning fork lateralizes to the left, bone conduction is greater than air conduction  His also noted to have what appears to be an effusion on left side  This is likely the cause of his hearing loss though due to the sudden nature of the hearing loss I recommend obtaining an audiogram to rule out any sudden   Sensorineural hearing loss  I also asked him to obtain a record of the prior audiogram from his previous ENT  He understands that if this is in fact a sudden sensorineural hearing loss that is a time sensitive problem  He will have the audiogram done as soon as possible and will follow up afterwards to review results and discuss options  History of Present Illness   Physician Requesting Consult: Luly Robertson DO  Reason for Consult / Principal Problem: Hearing loss  HPI: Yoseph Paul is a 45y o  year old male who presents with  Bilateral hearing loss  He states he has a history of bilateral hearing loss for which he was previously following another ENT  Approximately 2 weeks ago he was using a drill for work and forgot to use hearing protection and since then he has noticed a decrease in his left-sided hearing  He also relates associated pulsatile tinnitus  No ear pain or discharge  No other concerns  Review of systems:  ROS was performed by the MA and documented in the attached note  This was reviewed personally      Historical Information   Past Medical History:   Diagnosis Date    Anxiety     Cervical spinal stenosis     Hyperlipidemia     Seasonal allergies     Tinnitus     left ear    TMJ click      Past Surgical History:   Procedure Laterality Date    OTHER SURGICAL HISTORY      Gum graft     OK EXC SKIN BENIG 1 1-2 CM TRUNK,ARM,LEG N/A 12/20/2019    Procedure: EXC Bx MASS LT BACK & RT CHEST;  Surgeon: Chavez Niño MD;  Location: [facility];  Service: General    VASECTOMY      WISDOM TOOTH EXTRACTION       Social History   Social History     Substance and Sexual Activity   Alcohol Use Yes    Frequency: Monthly or less    Comment: 1 weekend/yr     Social History     Substance and Sexual Activity   Drug Use No     Social History     Tobacco Use   Smoking Status Never Smoker   Smokeless Tobacco Never Used     Family History:   Family History   Problem Relation Age of Onset    Cancer Mother         Possibly ovarian     Other Father         Premature coronary heart disease    Hypertension Father     Other Family         Premature Coronary Heart Disease    Other Family         Multiple Allergies    Rheum arthritis Brother        Current Outpatient Medications on File Prior to Visit   Medication Sig    albuterol (PROVENTIL HFA,VENTOLIN HFA) 90 mcg/act inhaler Inhale 2 puffs every 6 (six) hours as needed    atorvastatin (LIPITOR) 40 mg tablet Take 1 tablet (40 mg total) by mouth daily    azelastine (ASTELIN) 0 1 % nasal spray 1 spray into each nostril 2 (two) times a day as needed Use in each nostril as directed     Epinastine HCl 0 05 % ophthalmic solution INSTILL 1 DROP INTO BOTH EYES 4 TIMES A DAY AS NEEDED    fluticasone (FLOVENT HFA) 110 MCG/ACT inhaler Inhale 2 puffs 2 (two) times a day as needed Rinse mouth after use      loratadine (CLARITIN) 10 mg tablet Take 10 mg by mouth daily as needed     naproxen (NAPROSYN) 500 mg tablet Take 1 tablet (500 mg total) by mouth 2 (two) times a day with meals    zolpidem (AMBIEN) 5 mg tablet Take 1 tablet (5 mg total) by mouth daily at bedtime as needed for sleep     No current facility-administered medications on file prior to visit  No Known Allergies    Vitals:    07/24/20 1341   BP: 124/88   Pulse: 86   Temp: (!) 96 7 °F (35 9 °C)       Physical Exam   Constitutional: Oriented to person, place, and time  Well-developed and well-nourished, no apparent distress, non-toxic appearance  Cooperative, able to hear and answer questions without difficulty  Voice: Normal voice quality  Head: Normocephalic, atraumatic  No scars, masses or lesions  Face: Symmetric, no edema, no sinus tenderness  Eyes: Vision grossly intact, extra-ocular movement intact  Ears: External ears normal  Tympanic membranes intact with intact normal landmarks  No post-auricular erythema or tenderness  Nose: Septum intact, nares clear  Mucosa moist, turbinates well appearing  No crusting, polyps or discharge evident  Oral cavity: Dentition intact  Mucosa moist, lips without lesions or masses  Tongue mobile, floor of mouth soft and flat  Hard palate intact  No masses or lesions  Oropharynx: Uvula is midline, soft palate intact without lesion or mass  Oropharyngeal inlet without obstruction  Tonsils unremarkable  Posterior pharyngeal wall clear  No masses or lesions  Salivary glands:  Parotid glands and submandibular glands symmetric, no enlargement or tenderness  Neck: Normal laryngeal elevation with swallow  Trachea midline  No masses or lesions  No palpable adenopathy  Thyroid: Without tenderness or palpable nodules  Pulmonary/Chest: Normal effort and rate  No respiratory distress  No stertor or stridor  Musculoskeletal: Normal range of motion  Neurological: Cranial nerves 2-12 intact  Skin: Skin is warm and dry  Psychiatric: Normal mood and affect  Imaging Studies: I have personally reviewed pertinent reports  Lab Results: I have personally reviewed pertinent lab results        Greater than 40 minutes were spent in consultation  More than 1/2 of that time was spent in counselling and coordination of care

## 2020-07-28 ENCOUNTER — OFFICE VISIT (OUTPATIENT)
Dept: AUDIOLOGY | Age: 50
End: 2020-07-28
Payer: COMMERCIAL

## 2020-07-28 DIAGNOSIS — H90.A22 SENSORINEURAL HEARING LOSS (SNHL) OF LEFT EAR WITH RESTRICTED HEARING OF RIGHT EAR: Primary | ICD-10-CM

## 2020-07-28 DIAGNOSIS — H90.A11 CONDUCTIVE HEARING LOSS OF RIGHT EAR WITH RESTRICTED HEARING OF LEFT EAR: ICD-10-CM

## 2020-07-28 PROCEDURE — 92557 COMPREHENSIVE HEARING TEST: CPT | Performed by: AUDIOLOGIST

## 2020-07-28 PROCEDURE — 92567 TYMPANOMETRY: CPT | Performed by: AUDIOLOGIST

## 2020-07-28 NOTE — PROGRESS NOTES
HEARING EVALUATION    Name:  Kika Peterson  :  1970  Age:  45 y o  Date of Evaluation: 20     History: Tinnitus and Sudden Loss  Reason for visit: Kika Peterson is being seen today at the request of Dr Alex Chan for an evaluation of hearing  Patient reports "complete deafness" in his left ear that began the evening after which he used a power tool  He also notes increased high-pitched tinnitus and pressure in his left ear  He reports some improvement since the hearing loss first occurred  The patient reports a sinus infection 2 years ago after which his right ear never felt like it fully recovered  He reports a history of ear infections at least annually  EVALUATION:    Otoscopic Evaluation:   Right Ear: Minimum cerumen    Left Ear: Minimum cerumen     Tympanometry:   Right: Type A - normal middle ear pressure and compliance   Left: Type A - normal middle ear pressure and compliance    Audiogram Results:  Pure tone testing revealed a mild rising to normal likely conductive hearing loss in the  right ear and a mild precipitously dropping to  to severe/moderately severe sensorineural hearing loss with a conductive component at 1,000Hz  in the  left  ear  SRT and PTA are in agreement indicating good test reliability  Word recognition scores were excellent in the right ear and very poor (16%) in the left ear  The patient was able to provide a copy of an audiogram completed on 2018 showing essentially normal hearing to [facility], notching to mild/moderate hearing loss at [facility]  *See attached audiogram      RECOMMENDATIONS:  Consult ENT, Return to [facility]  for F/U and Copy to Patient/Caregiver    PATIENT EDUCATION:   Discussed results and recommendations with patient  Questions were addressed and the patient was encouraged to contact our department should concerns arise        Nathaniel Comer , CCC-A  Clinical Audiologist

## 2020-07-28 NOTE — LETTER
HEARING EVALUATION    Name:  William Pimentel  :  1970  Age:  45 y o  Date of Evaluation: 20     History: Tinnitus and Sudden Loss  Reason for visit: William Pimentel is being seen today at the request of Dr Crockett Later for an evaluation of hearing  Patient reports "complete deafness" in his left ear that began the evening after which he used a power tool  He also notes increased high-pitched tinnitus and pressure in his left ear  He reports some improvement since the hearing loss first occurred  The patient reports a sinus infection 2 years ago after which his right ear never felt like it fully recovered  He reports a history of ear infections at least annually  EVALUATION:    Otoscopic Evaluation:   Right Ear: Minimum cerumen    Left Ear: Minimum cerumen     Tympanometry:   Right: Type A - normal middle ear pressure and compliance   Left: Type A - normal middle ear pressure and compliance    Audiogram Results:  Pure tone testing revealed a mild rising to normal likely conductive hearing loss in the  right ear and a mild precipitously dropping to  to severe/moderately severe sensorineural hearing loss with a conductive component at 1,000Hz  in the  left  ear  SRT and PTA are in agreement indicating good test reliability  Word recognition scores were excellent in the right ear and very poor (16%) in the left ear  The patient was able to provide a copy of an audiogram completed on 2018 showing essentially normal hearing to [facility], [facility]  *See attached audiogram      RECOMMENDATIONS:  Consult ENT, Return to [facility]  for F/U and Copy to Patient/Caregiver    PATIENT EDUCATION:   Discussed results and recommendations with patient  Questions were addressed and the patient was encouraged to contact our department should concerns arise        Nathaniel Rocha , CCC-A  Clinical Audiologist

## 2020-07-29 ENCOUNTER — OFFICE VISIT (OUTPATIENT)
Dept: FAMILY MEDICINE CLINIC | Facility: CLINIC | Age: 50
End: 2020-07-29
Payer: COMMERCIAL

## 2020-07-29 VITALS
SYSTOLIC BLOOD PRESSURE: 120 MMHG | OXYGEN SATURATION: 98 % | HEIGHT: 70 IN | TEMPERATURE: 98.2 F | HEART RATE: 71 BPM | BODY MASS INDEX: 26.34 KG/M2 | DIASTOLIC BLOOD PRESSURE: 80 MMHG | WEIGHT: 184 LBS

## 2020-07-29 DIAGNOSIS — J34.0 CELLULITIS OF EXTERNAL NOSE: ICD-10-CM

## 2020-07-29 DIAGNOSIS — L03.211 CELLULITIS OF FACE: Primary | ICD-10-CM

## 2020-07-29 PROCEDURE — 99213 OFFICE O/P EST LOW 20 MIN: CPT | Performed by: FAMILY MEDICINE

## 2020-07-29 PROCEDURE — 87205 SMEAR GRAM STAIN: CPT | Performed by: FAMILY MEDICINE

## 2020-07-29 PROCEDURE — 87147 CULTURE TYPE IMMUNOLOGIC: CPT | Performed by: FAMILY MEDICINE

## 2020-07-29 PROCEDURE — 87186 SC STD MICRODIL/AGAR DIL: CPT | Performed by: FAMILY MEDICINE

## 2020-07-29 PROCEDURE — 1036F TOBACCO NON-USER: CPT | Performed by: FAMILY MEDICINE

## 2020-07-29 PROCEDURE — 87070 CULTURE OTHR SPECIMN AEROBIC: CPT | Performed by: FAMILY MEDICINE

## 2020-07-29 PROCEDURE — 3008F BODY MASS INDEX DOCD: CPT | Performed by: FAMILY MEDICINE

## 2020-07-29 PROCEDURE — 3008F BODY MASS INDEX DOCD: CPT | Performed by: OTOLARYNGOLOGY

## 2020-07-29 RX ORDER — CEPHALEXIN 500 MG/1
500 CAPSULE ORAL EVERY 8 HOURS SCHEDULED
Qty: 21 CAPSULE | Refills: 0 | Status: SHIPPED | OUTPATIENT
Start: 2020-07-29 | End: 2020-08-05

## 2020-07-29 RX ORDER — SULFAMETHOXAZOLE AND TRIMETHOPRIM 800; 160 MG/1; MG/1
1 TABLET ORAL EVERY 12 HOURS SCHEDULED
Qty: 14 TABLET | Refills: 0 | Status: SHIPPED | OUTPATIENT
Start: 2020-07-29 | End: 2020-08-05

## 2020-07-29 NOTE — PROGRESS NOTES
Assessment/Plan:      Appears to have cellulitis of the nose  Culture sent  Will treat with Bactrim DS b i d  For 7 days along with Keflex 500 mg t i d  For 7 days  He may continue Tylenol and or ibuprofen as needed  He will contact us if symptoms are not improving in the next 24-48 hours  Will plan recheck in 1 week  Diagnoses and all orders for this visit:    Cellulitis of face  -     sulfamethoxazole-trimethoprim (BACTRIM DS) 800-160 mg per tablet; Take 1 tablet by mouth every 12 (twelve) hours for 7 days  -     cephalexin (KEFLEX) 500 mg capsule; Take 1 capsule (500 mg total) by mouth every 8 (eight) hours for 7 days  -     Wound culture and Gram stain    Cellulitis of external nose  -     Wound culture and Gram stain          Subjective:      Patient ID: Jerzy Ortiz is a 45 y o  male  He started with facial pain especially tip of nose 4 days ago  Originally he had crustiness inside the nose at the tip but had not been sick otherwise  Then he developed severe pain when he touched the tip of the nose  He has been alternating Tylenol and Advil and still having significant pain  He denies fever or chills or other illness symptoms  The following portions of the patient's history were reviewed and updated as appropriate: allergies, current medications, past family history, past medical history, past social history, past surgical history and problem list     Review of Systems   Constitutional: Negative for activity change, appetite change, chills and fever  HENT: Negative for congestion  Respiratory: Negative for cough and wheezing  Cardiovascular: Negative for chest pain  Neurological: Negative for dizziness and headaches           Objective:      /80 (BP Location: Left arm, Patient Position: Sitting, Cuff Size: Large)   Pulse 71   Temp 98 2 °F (36 8 °C)   Ht 5' 10" (1 778 m)   Wt 83 5 kg (184 lb)   SpO2 98%   BMI 26 40 kg/m²          Physical Exam   Constitutional: He is oriented to person, place, and time  He appears well-developed and well-nourished  HENT:   Head: Normocephalic and atraumatic  Right Ear: External ear normal    Left Ear: External ear normal    The distal 1/3 the nose with erythema and swelling and acute tenderness  Cardiovascular: Normal rate, regular rhythm and normal heart sounds  Pulmonary/Chest: Effort normal and breath sounds normal    Neurological: He is alert and oriented to person, place, and time  Skin: Skin is warm and dry  Nursing note and vitals reviewed

## 2020-07-31 LAB
BACTERIA WND AEROBE CULT: ABNORMAL
GRAM STN SPEC: ABNORMAL
GRAM STN SPEC: ABNORMAL

## 2020-08-17 ENCOUNTER — APPOINTMENT (OUTPATIENT)
Dept: LAB | Facility: HOSPITAL | Age: 50
End: 2020-08-17
Payer: COMMERCIAL

## 2020-08-17 DIAGNOSIS — H91.22 SUDDEN LEFT HEARING LOSS: ICD-10-CM

## 2020-08-17 DIAGNOSIS — J34.0 CELLULITIS OF EXTERNAL NOSE: Primary | ICD-10-CM

## 2020-08-17 LAB
BUN SERPL-MCNC: 13 MG/DL (ref 5–25)
CREAT SERPL-MCNC: 0.85 MG/DL (ref 0.6–1.3)
GFR SERPL CREATININE-BSD FRML MDRD: 101 ML/MIN/1.73SQ M

## 2020-08-17 PROCEDURE — 36415 COLL VENOUS BLD VENIPUNCTURE: CPT

## 2020-08-17 PROCEDURE — 84520 ASSAY OF UREA NITROGEN: CPT

## 2020-08-17 PROCEDURE — 82565 ASSAY OF CREATININE: CPT

## 2020-08-17 PROCEDURE — 86618 LYME DISEASE ANTIBODY: CPT

## 2020-08-17 RX ORDER — SULFAMETHOXAZOLE AND TRIMETHOPRIM 800; 160 MG/1; MG/1
1 TABLET ORAL EVERY 12 HOURS SCHEDULED
Qty: 14 TABLET | Refills: 0 | Status: SHIPPED | OUTPATIENT
Start: 2020-08-17 | End: 2020-08-24

## 2020-08-18 LAB — B BURGDOR IGG+IGM SER-ACNC: <0.91 ISR (ref 0–0.9)

## 2020-08-19 ENCOUNTER — HOSPITAL ENCOUNTER (OUTPATIENT)
Dept: MRI IMAGING | Facility: HOSPITAL | Age: 50
Discharge: HOME | End: 2020-08-19
Payer: COMMERCIAL

## 2020-08-19 DIAGNOSIS — H91.22 SUDDEN LEFT HEARING LOSS: ICD-10-CM

## 2020-08-19 PROCEDURE — G1004 CDSM NDSC: HCPCS

## 2020-08-19 PROCEDURE — 70553 MRI BRAIN STEM W/O & W/DYE: CPT

## 2020-08-19 PROCEDURE — A9585 GADOBUTROL INJECTION: HCPCS | Performed by: PHYSICIAN ASSISTANT

## 2020-08-19 RX ADMIN — GADOBUTROL 8 ML: 604.72 INJECTION INTRAVENOUS at 09:31

## 2020-08-24 DIAGNOSIS — D33.3 ACOUSTIC NEUROMA (HCC): Primary | ICD-10-CM

## 2020-08-24 DIAGNOSIS — J34.0 CELLULITIS OF EXTERNAL NOSE: ICD-10-CM

## 2020-08-26 ENCOUNTER — TELEPHONE (OUTPATIENT)
Dept: NEUROLOGY | Facility: CLINIC | Age: 50
End: 2020-08-26

## 2020-08-26 ENCOUNTER — PATIENT MESSAGE (OUTPATIENT)
Dept: NEUROLOGY | Facility: CLINIC | Age: 50
End: 2020-08-26

## 2020-08-26 NOTE — TELEPHONE ENCOUNTER
Pt made aware of below  Offered patient the phone # for [facility]  He then received another call from a  # and stated he will call back  It appears pt has been rescheduled with Dr Mervat Majano to 9/24/20  Sent patient a Jaegert message with [facility] phone #

## 2020-08-26 NOTE — TELEPHONE ENCOUNTER
Pt is currently scheduled with Dr Mc Lee on 10/27/20  Pt has been in contact with ENT regarding neurosurgeons/consults  Called and Left a message on pt's answering machine for a call back  Need to make him aware of the following:    "Neurosurgical evaluation must happen first ( on first available slot patient can get)  and then patient can see me on the first available appointment "    Eric Galdamez- are you able to find a sooner appt with Dr Mc Lee for the patient? Thank you!

## 2020-08-26 NOTE — TELEPHONE ENCOUNTER
Myrtle Gardner MD  You; Damián Ayala MA 19 minutes ago (1:01 PM)         Patient's  does not have established care with us, as I understood  Neurosurgical evaluation must happen first ( on first available slot patient can get)  and then patient can see me on the first available appointment  Please let the patient know     Sue - please help with scheduling patient's  with me as per request  Keep the patient on cancellation list         Documentation       Ene Mcduffie, PURNIMA routed conversation to Myrtle Gardner MD 2 hours ago (11:14 AM)       Ene Mcduffie RN 2 hours ago (11:14 AM)          Dr ELVER Levy the following message was received from the patients  via the patients [employer] account  Please advise the clinical team how you would like to proceed  Documentation       Ene Mcduffie RN 2 hours ago (11:13 AM)          ----- Message from Edwin Silveira sent at 8/26/2020  8:36 AM EDT -----  Regarding: Test Results Question  Contact: [phone]  Hi Dr Ellison Fearing,       This is Layne's  Dulce Denson  At Layne's last visit I mentioned some hearing issues and you said to come see you after I had an MRI  I had the MRI  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  Yes, I have a tumor in my head :(       The soonest I could get an official consult with you is late October  That appointment was made, but will likely be too late in the process        I welcome any input you have here  The info I'm getting is that the hearing is gone, any medical treatment is purely one of keeping the tumor from effecting balance nerves and things like that  Currently I have some small amount of hearing that comes and goes  But often noise is painful enough that I use an ear plug to keep it from flaring  On the good days though, it's enough hearing to augment my good right ear      This is someting I suspect you can give feedback on        If you want to pull up any records its Viviana Mcgraw (Dulce Denson) Renata Gar  1970      If you have any suggestions for a Randell Tony neurosurgeon that is experienced in this that would be great      I have gotten a consult with Dr Bianca Bernard at [facility] mid september  [facility] also has a neururgeon that does acoustic neuroma treatment (Dr Charla Boyer) but I haven't gotten in the door over there yet    I'll be working on that today      Thank you in advance for any guidance      Winsome Hatch

## 2020-08-27 NOTE — TELEPHONE ENCOUNTER
Called patient's mobile # and reached voicemail for integrity audio  Did not leave a voicemail  Called and Left a message on pt's answering machine for a call back at home #       MyChart message sent to patient making him aware referral has been entered by Hanna Graves PA-C

## 2020-08-27 NOTE — TELEPHONE ENCOUNTER
Patient has no established care with our practice - I will not be able to prove any referrals or advises till patient is seeing by me - legal issues consideration

## 2020-08-27 NOTE — TELEPHONE ENCOUNTER
Lahoma Homans, pt is asking for a referral to [facility]  I saw that you were discussing this with the patient  Would you be agreeable to providing referral for the patient? Thanks!

## 2020-08-27 NOTE — TELEPHONE ENCOUNTER
Pecolia Lefort, PA-C Marrie Maples, RN Hi Maire Nakai,   Yes!  I placed orders for neurosurgery  Surya Huerta so much!    Pecolia Lefort, PA-C

## 2020-08-27 NOTE — TELEPHONE ENCOUNTER
Patient called and stated he called neurosurg and they are requesting a referral be put in the system so they can schedule him  Please create referral so I can get him set up with neurosurg  Thanks!

## 2020-08-27 NOTE — TELEPHONE ENCOUNTER
Dr Mervat Majano- are you agreeable to placing this referral or would you like this to be ordered by ENT as you have not yet seen the patient?

## 2020-08-27 NOTE — TELEPHONE ENCOUNTER
Problem: Stroke: Ischemic (Transient/Permanent)  Intervention: # Provide Stroke Education Packet within 24 hrs: supplement as needed  Stroke education given to patient and understanding packet given to patient. At bedside.          Pt made aware

## 2020-09-04 ENCOUNTER — CONSULT (OUTPATIENT)
Dept: NEUROSURGERY | Facility: CLINIC | Age: 50
End: 2020-09-04
Payer: COMMERCIAL

## 2020-09-04 VITALS
DIASTOLIC BLOOD PRESSURE: 82 MMHG | WEIGHT: 183.6 LBS | HEIGHT: 70 IN | BODY MASS INDEX: 26.28 KG/M2 | RESPIRATION RATE: 16 BRPM | HEART RATE: 66 BPM | SYSTOLIC BLOOD PRESSURE: 124 MMHG | TEMPERATURE: 98 F

## 2020-09-04 DIAGNOSIS — H91.22 SUDDEN LEFT HEARING LOSS: ICD-10-CM

## 2020-09-04 DIAGNOSIS — H90.42 SENSORINEURAL HEARING LOSS (SNHL) OF LEFT EAR WITH UNRESTRICTED HEARING OF RIGHT EAR: ICD-10-CM

## 2020-09-04 DIAGNOSIS — D33.3 VESTIBULAR SCHWANNOMA (HCC): ICD-10-CM

## 2020-09-04 DIAGNOSIS — H93.12 TINNITUS OF LEFT EAR: Primary | ICD-10-CM

## 2020-09-04 DIAGNOSIS — D33.3 ACOUSTIC NEUROMA (HCC): ICD-10-CM

## 2020-09-04 PROBLEM — H91.92 HEARING LOSS, LEFT: Status: ACTIVE | Noted: 2020-09-04

## 2020-09-04 PROCEDURE — 99204 OFFICE O/P NEW MOD 45 MIN: CPT | Performed by: NEUROLOGICAL SURGERY

## 2020-09-04 NOTE — PROGRESS NOTES
Neurosurgery Office Note  Cheryl Del Rio 45 y o  male MRN: [id number]      Assessment/Plan      Diagnoses and all orders for this visit:    Tinnitus of left ear    Vestibular schwannoma [facility])  -     Ambulatory referral to Neurosurgery    Sensorineural hearing loss (SNHL) of left ear with unrestricted hearing of right ear  -     Ambulatory referral to Neurosurgery    Sudden left hearing loss  -     Ambulatory referral to Neurosurgery    Acoustic neuroma [facility])        Discussion:    45year old man who  About 2 years ago experienced some left tinnitus that coincided with a sinus infection  He was treated by ENT, and some of his left ear symptoms improved but not fully resolved  About 6- 8 weeks ago, while working in using a "multi tool" it admitted a pinch and he had instantaneous decreased hearing in his left side  He went to his family doctor who referred him to Dr Bisi Mak a  They performed audiometry and then sent the patient for an MRI scan of the brain demonstrating what appears to be a left acoustic neuroma  the patient does have an appointment with Dr Sundeep RODRIGUEZ down a [facility] on 09/16  Patient denies vertigo, has occasional headaches that may last for a couple of days, with migrainous features  He does have tenderness on the left which comes and goes, denies facial weakness  MRI scan demonstrates a 1 5 centimeter left acoustic neuroma, Koos stage 2  Audiometry shows word recognition score on the left 76 percent, SRT 45 decibels  This is mildly improved from about a week earlier  I reviewed the patient various options for management  Often I recommend a follow-up scan in about 6 months on 1st diagnosis of this finding  I also discussed with him radio surgery and surgery as management options  He did recently see informally the new neuro-otologist  As part of the [facility] [employer]     I cited to him that radio surgery is excellent at local control, with a minimum risk of cranial nerve palsy suggest basal weakness although the most likely consequence would be decreased hearing on that affected side  Risks of surgery I mentioned included but not limited to risk of anesthesia, spinal fluid leak, and approximately 15 percent risk of some v version of permanent facial weakness, and likely worsening of his hearing  The patient does work in a new visual/technical field and is quite rely on his hearing for his likelihood  He also is hesitant to "do nothing" and wants to be proactive  We discussed this at length  Time spent with the patient was in excess of 60 minutes, more than half of which was on counseling and coordination of care   The patient also has a family friend, a neurosurgeon who was moved to [facility], who gave a very similar   Advice to mine  Ultimately, we agreed that I we would follow up by phone after his visit with Dr Peyton Barragan and decide next steps  09/04/20 Metrics: [id number] [id number]; VAS 90; ECOG 1; KPS 90; MOCA 29/30        CHIEF COMPLAINT    Chief Complaint   Patient presents with   [address] Brain Tumor       HISTORY    History of Present Illness     45y o  year old male     HPI    See Discussion    REVIEW OF SYSTEMS    Review of Systems   Constitutional: Positive for activity change (when he has symptoms he feels like he needs to stay home and relax), appetite change (hungry all the time) and fatigue (and tired w/HA)  HENT: Positive for hearing loss (left side) and tinnitus (left side; also has sound sensitivity)  Eyes: Positive for visual disturbance (severe vision loss for 10+ years)  Wears glasses   Respiratory: Negative  Cardiovascular: Positive for chest pain (muscular pain in the left chest)  Gastrointestinal: Negative  Endocrine: Negative  Genitourinary: Negative  Musculoskeletal: Negative  Skin: Negative  Allergic/Immunologic: Negative      Neurological: Positive for dizziness (vertigo), speech difficulty (trouble word finding), light-headedness and headaches  Negative for tremors (he does reports thart last week he had some tremors in the 4th and 5th fingers on the left side)  Hematological: Negative  Psychiatric/Behavioral: The patient is nervous/anxious  All other systems reviewed and are negative  Meds/Allergies     Current Outpatient Medications   Medication Sig Dispense Refill    albuterol (PROVENTIL HFA,VENTOLIN HFA) 90 mcg/act inhaler Inhale 2 puffs every 6 (six) hours as needed      atorvastatin (LIPITOR) 40 mg tablet Take 1 tablet (40 mg total) by mouth daily 90 tablet 1    azelastine (ASTELIN) 0 1 % nasal spray 1 spray into each nostril 2 (two) times a day as needed Use in each nostril as directed       Epinastine HCl 0 05 % ophthalmic solution INSTILL 1 DROP INTO BOTH EYES 4 TIMES A DAY AS NEEDED      fluticasone (FLOVENT HFA) 110 MCG/ACT inhaler Inhale 2 puffs 2 (two) times a day as needed Rinse mouth after use   loratadine (CLARITIN) 10 mg tablet Take 10 mg by mouth daily as needed       naproxen (NAPROSYN) 500 mg tablet Take 1 tablet (500 mg total) by mouth 2 (two) times a day with meals (Patient taking differently: Take 500 mg by mouth as needed ) 60 tablet 3    zolpidem (AMBIEN) 5 mg tablet Take 1 tablet (5 mg total) by mouth daily at bedtime as needed for sleep 30 tablet 2     No current facility-administered medications for this visit          No Known Allergies    PAST HISTORY    Past Medical History:   Diagnosis Date    Anxiety     Cervical spinal stenosis     Hyperlipidemia     Seasonal allergies     Tinnitus     left ear    TMJ click        Past Surgical History:   Procedure Laterality Date    OTHER SURGICAL HISTORY      Gum graft     UT EXC SKIN BENIG 1 1-2 CM TRUNK,ARM,LEG N/A 12/20/2019    Procedure: EXC Bx MASS LT BACK & RT CHEST;  Surgeon: Chavez Niño MD;  Location: [facility];  Service: General    VASECTOMY      WISDOM TOOTH EXTRACTION Social History     Tobacco Use    Smoking status: Never Smoker    Smokeless tobacco: Never Used   Substance Use Topics    Alcohol use: Yes     Frequency: Monthly or less     Comment: 1 weekend/yr    Drug use: No       Family History   Problem Relation Age of Onset    Cancer Mother         Possibly ovarian     Other Father         Premature coronary heart disease    Hypertension Father     Other Family         Premature Coronary Heart Disease    Other Family         Multiple Allergies    Rheum arthritis Brother          The following portions of the patient's history were reviewed in this encounter and updated as appropriate: Past medical, surgical, family, and social history, as well as medications, allergies, and review of systems  EXAM    Vitals:Blood pressure 124/82, pulse 66, temperature 98 °F (36 7 °C), temperature source Tympanic, resp  rate 16, height 5' 10" (1 778 m), weight 83 3 kg (183 lb 9 6 oz)  ,Body mass index is 26 34 kg/m²  Physical Exam  Vitals signs reviewed  Constitutional:       Appearance: Normal appearance  He is well-developed  HENT:      Head: Normocephalic and atraumatic  Eyes:      General: No scleral icterus  Neck:      Musculoskeletal: Neck supple  Cardiovascular:      Rate and Rhythm: Normal rate  Pulmonary:      Effort: Pulmonary effort is normal    Skin:     General: Skin is warm and dry  Neurological:      Mental Status: He is alert and oriented to person, place, and time  Sensory: No sensory deficit  Psychiatric:         Speech: Speech normal          Behavior: Behavior normal          Neurologic Exam     Mental Status   Oriented to person, place, and time  Attention: normal    Speech: speech is normal   Level of consciousness: alert    Cranial Nerves     CN VII   Facial expression full, symmetric       Motor Exam   Muscle bulk: normal  Overall muscle tone: normal  Moves all extremities, grossly normal     Gait, Coordination, and Reflexes     Tremor   Resting tremor: absent  Intention tremor: absent  Action tremor: absent  No aids  MEDICAL DECISION MAKING    Imaging Studies:     Mri Brain Iac Wo And W Contrast    Result Date: 8/19/2020  Narrative: MRI BRAIN AND IAC'S -  WITH AND WITHOUT CONTRAST INDICATION: [id number] [id number]: Sudden idiopathic hearing loss, left ear  COMPARISON:  None  TECHNIQUE: Brain:   Sagittal T1, axial T2, axial Gradient, axial T1, axial FLAIR, axial diffusion imaging  Axial T1 postcontrast   Axial T1 fspgr post contrast  IAC'S:  Coronal T2, coronal T1 postcontrast, axial T1 postcontrast with fat suppression  Targeted images of the IAC'S were performed requiring additional time at acquisition and interpretation of approximately 25% IV Contrast:  8 mL of Gadobutrol injection (SINGLE-DOSE) IMAGE QUALITY:   Diagnostic  FINDINGS: BRAIN PARENCHYMA:  There is no discrete mass, mass effect or midline shift  There is a single punctate T2/FLAIR hyperintensity within the right frontal white matter which is nonspecific for a patient of this age and may represent sequela of mild chronic microangiopathic change  Brainstem and cerebellum demonstrate normal signal  There is no intracranial hemorrhage  There is no evidence of acute infarction and diffusion imaging is unremarkable  Normal postcontrast imaging  IAC'S:  Dedicated imaging of the IACs demonstrates a homogeneously enhancing left CP angle mass extending into the internal auditory canal, best seen on image 7 of series 1001 and images 9 and 10 of series 901  This measures 1 5 x 0 9 cm without mass effect upon the brainstem  VENTRICLES:  Normal  SELLA AND PITUITARY GLAND:  Normal  ORBITS:  Normal  PARANASAL SINUSES:  Normal  VASCULATURE:  Evaluation of the major intracranial vasculature demonstrates appropriate flow voids   CALVARIUM AND SKULL BASE:  Normal  EXTRACRANIAL SOFT TISSUES:  Normal      Impression: Homogeneously enhancing left CP angle mass consistent with left acoustic neuroma  See series 1001, image 7  The study was marked in [facility] for immediate notification  Workstation performed: [id number]       I have personally reviewed pertinent reports  and I have personally reviewed pertinent films in PACS     Audiometry reviewed, as above described

## 2020-09-17 DIAGNOSIS — R42 VERTIGO: Primary | ICD-10-CM

## 2020-09-18 ENCOUNTER — TELEPHONE (OUTPATIENT)
Dept: NEUROLOGY | Facility: CLINIC | Age: 50
End: 2020-09-18

## 2020-09-18 NOTE — TELEPHONE ENCOUNTER
LMOM for pt to call back to confirm scheduled appointment for Thursday 9/24/2020 with Dr Loomis Payment in [place]

## 2020-09-21 ENCOUNTER — TELEPHONE (OUTPATIENT)
Dept: NEUROSURGERY | Facility: CLINIC | Age: 50
End: 2020-09-21

## 2020-09-21 NOTE — TELEPHONE ENCOUNTER
Dr Kathi Hernandez requested for this RN to follow up with the patient to see how the appointment at [facility] went  Called patient  Patient reports the appointment at [facility] was "diasppointing " He reports he is interested in [facility] eventually, however would like to do more research  He said Dr Sarah Mayorga at [facility] had a similar opinion to Dr Isai Mena  He reports Dr Sarah Mayorga said how the acoustic neuroma is not urgent and agree [facility] is appropriate  Offered to schedule patient an appointment at [facility] to further discuss  Patient said he would like to hold off the appointment since he wishes to do more research  Patient said he will call us once he is ready  He was appreciative for the follow up

## 2020-09-22 NOTE — PROGRESS NOTES
[facility]  PATIENT:  Gardenia Reyes  MRN:  [id number]  :  1970  DATE OF SERVICE:  2020    Assessment/Plan:           Problem List Items Addressed This Visit        Nervous and Auditory    Acoustic neuroma [facility])         - Mr Chester Caldwell has presented to [facility] multiple [address] for evaluation of vestibular and hearing nerve dysfunction on the left side due to acoustic neuroma  Patient's symptoms started 2 years ago with acutely and progressively getting worse in summer 2020  Patient had completed multiple evaluation by neurosurgical team at multiple institutions; were able treatment choices has been discussed with the patient including risk and benefits or outcomes of his intervention, including surgical versus radiation therapy  From Neurology standpoint, we agreed patient may get worse considering his hearing and vestibular function as well as facial paralysis at extreme;  Patient was advised to consider balance therapy with initiating lipoflavinoids; No other focal neurologic deficit noted, except sensory loss left V1-V3 distribution, no obvious weakness, but tightness in his face reported as well  Patient is to follow with primary care team, follow up with Neurology is on as needed bases  Patient is to continue practicing safety measures during the novel coronovirus public health emergency  Subjective: pt here for consult for his acoustic neuroma dx  Pt states facial numbness, vertigo, apetite change, fatigue, hearing loss, balance issues  HPI  Mr Chester Caldwell is a 44 yo male who was referred to [address] [place] [address] for acoustic neuroma  Patient was evaluated by Dr Dipti Muse at [facility] and Dr Vinay Martins at [address] [place] [address] Neurosurgical team; Dr Valentina Sam for vestibular schwannoma, SNHL and acute left hearing loss; tinnitus;    Patient has mass of chest wall, reactive arthritis, elevated CRP; polyarthralgia;     MRI brain/IAC: Dedicated imaging of the IACs demonstrates a homogeneously enhancing left CP angle mass extending into the internal auditory canal, best seen on image 7 of series 1001 and images 9 and 10 of series 901  This measures 1 5 x 0 9 cm without mass   effect upon the brainstem  He describes other sensations over his face  There have been occasional headaches  He is not aware aggravating or alleviating factors  There is no history of head trauma, loss of consciousness, noise exposure or ototoxic medication exposure  He reports that he has seen a neurosurgeon locally  He has non serviceable severe hearing loss in his left ear  Dr Danilo Tiwari evaluated the patient, who indicated that he is not interested in surgery but he is interested in possibly considering radiation therapy  I encouraged him to seek an opinion at [facility] as that is close to his home  Otherwise, patient was recommended that he have another MRI scan in 6 months if he continues with observation and surveillance  The following portions of the patient's history were reviewed and updated as appropriate:   He  has a past medical history of Anxiety, Cervical spinal stenosis, Hyperlipidemia, Seasonal allergies, Tinnitus, and TMJ click  He   Patient Active Problem List    Diagnosis Date Noted    Acoustic neuroma ([facility] [address] ) 09/04/2020    Hearing loss, left 09/04/2020    Tinnitus of left ear 07/21/2020    Inflammatory arthritis 01/06/2020    Encounter for annual physical exam 10/29/2019    Overweight (BMI 25 0-29 9) 04/30/2019    Family history of heart disease 04/30/2019    Chronic right shoulder pain 06/14/2018    Cervical radiculopathy 06/04/2018    Herniated nucleus pulposus, C4-5 left 03/16/2015    Acid reflux 02/05/2015    Vitamin D deficiency 09/26/2014    Insomnia 06/17/2014    Seasonal allergies 06/17/2014    Hypercholesterolemia 04/14/2014     He  has a past surgical history that includes Vasectomy; [place] tooth extraction;  Other surgical history; and pr exc skin benig 1 1-2 cm trunk,arm,leg (N/A, 12/20/2019)  His family history includes Cancer in his mother; Hypertension in his father; Other in his family, family, and father; Rheum arthritis in his brother  He  reports that he has never smoked  He has never used smokeless tobacco  He reports current alcohol use  He reports that he does not use drugs  Current Outpatient Medications   Medication Sig Dispense Refill    albuterol (PROVENTIL HFA,VENTOLIN HFA) 90 mcg/act inhaler Inhale 2 puffs every 6 (six) hours as needed      atorvastatin (LIPITOR) 40 mg tablet Take 1 tablet (40 mg total) by mouth daily 90 tablet 1    azelastine (ASTELIN) 0 1 % nasal spray 1 spray into each nostril 2 (two) times a day as needed Use in each nostril as directed       Epinastine HCl 0 05 % ophthalmic solution INSTILL 1 DROP INTO BOTH EYES 4 TIMES A DAY AS NEEDED      fluticasone (FLOVENT HFA) 110 MCG/ACT inhaler Inhale 2 puffs 2 (two) times a day as needed Rinse mouth after use   loratadine (CLARITIN) 10 mg tablet Take 10 mg by mouth daily as needed       naproxen (NAPROSYN) 500 mg tablet Take 1 tablet (500 mg total) by mouth 2 (two) times a day with meals (Patient taking differently: Take 500 mg by mouth as needed ) 60 tablet 3    zolpidem (AMBIEN) 5 mg tablet Take 1 tablet (5 mg total) by mouth daily at bedtime as needed for sleep (Patient taking differently: Take 2 5 mg by mouth as needed for sleep ) 30 tablet 2     No current facility-administered medications for this visit        Current Outpatient Medications on File Prior to Visit   Medication Sig    albuterol (PROVENTIL HFA,VENTOLIN HFA) 90 mcg/act inhaler Inhale 2 puffs every 6 (six) hours as needed    atorvastatin (LIPITOR) 40 mg tablet Take 1 tablet (40 mg total) by mouth daily    azelastine (ASTELIN) 0 1 % nasal spray 1 spray into each nostril 2 (two) times a day as needed Use in each nostril as directed     Epinastine HCl 0 05 % ophthalmic solution INSTILL 1 DROP INTO BOTH EYES 4 TIMES A DAY AS NEEDED    fluticasone (FLOVENT HFA) 110 MCG/ACT inhaler Inhale 2 puffs 2 (two) times a day as needed Rinse mouth after use   loratadine (CLARITIN) 10 mg tablet Take 10 mg by mouth daily as needed     naproxen (NAPROSYN) 500 mg tablet Take 1 tablet (500 mg total) by mouth 2 (two) times a day with meals (Patient taking differently: Take 500 mg by mouth as needed )    zolpidem (AMBIEN) 5 mg tablet Take 1 tablet (5 mg total) by mouth daily at bedtime as needed for sleep (Patient taking differently: Take 2 5 mg by mouth as needed for sleep )     No current facility-administered medications on file prior to visit  He has No Known Allergies            Objective:    Blood pressure 130/73, pulse 77, temperature (!) 96 8 °F (36 °C), temperature source Temporal, resp  rate 16, height 5' 10" (1 778 m), weight 84 4 kg (186 lb 1 6 oz)  Physical Exam    Neurological Exam  CONSTITUTIONAL: NAD, pleasant  NECK: supple, no lymphadenopathy, no thyromegaly, no JVD  CARDIOVASCULAR: RRR, normal S1S2, no murmurs, no rubs  RESP: clear to auscultation bilaterally, no wheezes/rhonchi/rales  ABDOMEN: soft, non tender, non distended  SKIN: no rash or skin lesions  EXTREMITIES: no edema, pulses 2+bilaterally  PSYCH: appropriate mood and affect  NEUROLOGIC COMPREHENSIVE EXAM: Patient is oriented to person, place and time, NAD; appropriate affect  CN II, III, IV, V, VI, VII,VIII,IX,X,XI-XII intact with EOMI, PERRLA, OKN intact, VF grossly intact, fundi poorly visualized secondary to pupillary constriction; symmetric face noted  Motor: 5/5 UE/LE bilateral symmetric; Sensory: tightness in distribution of left V1-V2-V3; normal vibration sensation feet bilaterally; Coordination within normal limits on FTN and DREA testing; DTR: 2/4 through, no Babinski, no clonus  Tandem gait is abnormal  Romberg: negative        ROS:  12 points of review of system was reviewed with the patient and was unremarkable with exception: see HPI  Review of Systems    Constitutional: Positive for appetite change and fatigue  Negative for fever  HENT: Positive for hearing loss and tinnitus  Negative for trouble swallowing and voice change  Eyes: Positive for visual disturbance  Negative for photophobia and pain  Respiratory: Negative  Negative for shortness of breath  Cardiovascular: Negative  Negative for palpitations  Gastrointestinal: Positive for nausea  Negative for vomiting  Endocrine: Negative  Negative for cold intolerance  Genitourinary: Negative  Negative for dysuria, frequency and urgency  Musculoskeletal: Positive for gait problem  Negative for myalgias  Skin: Negative  Negative for rash  Allergic/Immunologic: Negative  Neurological: Positive for dizziness, numbness and headaches  Negative for tremors, seizures, syncope, facial asymmetry, speech difficulty, weakness and light-headedness  Hematological: Negative  Does not bruise/bleed easily  Psychiatric/Behavioral: Positive for sleep disturbance  Negative for confusion (focus issues) and hallucinations  The patient is nervous/anxious

## 2020-09-24 ENCOUNTER — CONSULT (OUTPATIENT)
Dept: NEUROLOGY | Facility: CLINIC | Age: 50
End: 2020-09-24
Payer: COMMERCIAL

## 2020-09-24 VITALS
TEMPERATURE: 96.8 F | DIASTOLIC BLOOD PRESSURE: 73 MMHG | BODY MASS INDEX: 26.64 KG/M2 | RESPIRATION RATE: 16 BRPM | SYSTOLIC BLOOD PRESSURE: 130 MMHG | WEIGHT: 186.1 LBS | HEART RATE: 77 BPM | HEIGHT: 70 IN

## 2020-09-24 DIAGNOSIS — D33.3 ACOUSTIC NEUROMA (HCC): ICD-10-CM

## 2020-09-24 DIAGNOSIS — R20.0 LEFT FACIAL NUMBNESS: Primary | ICD-10-CM

## 2020-09-24 PROCEDURE — 99244 OFF/OP CNSLTJ NEW/EST MOD 40: CPT | Performed by: PSYCHIATRY & NEUROLOGY

## 2020-09-24 PROCEDURE — 1036F TOBACCO NON-USER: CPT | Performed by: PSYCHIATRY & NEUROLOGY

## 2020-09-24 NOTE — PROGRESS NOTES
Review of Systems   Constitutional: Positive for appetite change and fatigue  Negative for fever  HENT: Positive for hearing loss and tinnitus  Negative for trouble swallowing and voice change  Eyes: Positive for visual disturbance  Negative for photophobia and pain  Respiratory: Negative  Negative for shortness of breath  Cardiovascular: Negative  Negative for palpitations  Gastrointestinal: Positive for nausea  Negative for vomiting  Endocrine: Negative  Negative for cold intolerance  Genitourinary: Negative  Negative for dysuria, frequency and urgency  Musculoskeletal: Positive for gait problem  Negative for myalgias  Skin: Negative  Negative for rash  Allergic/Immunologic: Negative  Neurological: Positive for dizziness, numbness and headaches  Negative for tremors, seizures, syncope, facial asymmetry, speech difficulty, weakness and light-headedness  Hematological: Negative  Does not bruise/bleed easily  Psychiatric/Behavioral: Positive for sleep disturbance  Negative for confusion (focus issues) and hallucinations  The patient is nervous/anxious

## 2020-09-28 ENCOUNTER — EVALUATION (OUTPATIENT)
Dept: PHYSICAL THERAPY | Facility: CLINIC | Age: 50
End: 2020-09-28
Payer: COMMERCIAL

## 2020-09-28 DIAGNOSIS — D33.3 ACOUSTIC NEUROMA (HCC): Primary | ICD-10-CM

## 2020-09-28 DIAGNOSIS — R42 VERTIGO: ICD-10-CM

## 2020-09-28 PROCEDURE — 97162 PT EVAL MOD COMPLEX 30 MIN: CPT | Performed by: PHYSICAL THERAPIST

## 2020-09-28 NOTE — PROGRESS NOTES
PT Evaluation     Today's date: 2020  Patient name: Nuzhat Cueto  : 1970  MRN: [id number]  Referring provider: Jessica Brown DO  Dx:   Encounter Diagnosis     ICD-10-CM    1  Acoustic neuroma (HCC)  D33 3    2  Vertigo  R42 Ambulatory referral to Physical Therapy       Start Time: 1000  Stop Time: 1115  Total time in clinic (min): 75 minutes    Assessment  Assessment details: Pt is a 45year old male referred with dizziness secondary to an acoustic schwannoma  Pt presented today with impairments in decreased standing balance, increased dizziness, abnormal DVA testing all which limit him functionally with gait, elevations, and ability to complete his job  Pt will benefit from PT services needed to address above impairments and reduce symptoms  Impairments: abnormal gait, impaired balance, lacks appropriate home exercise program and safety issue    Goals  ST  Pt will improve gait speed to at least 1 m/s with least restrictive device within 4 weeks needed to improve safety with ambulation  2  Pt will improve FGA score by at least 3 points within 4 weeks needed to reduce fall risk  3  Pt will demonstrate independence with HEP within 4 weeks  LT  Pt will improve gait speed to at least 1 5 m/s with least restrictive device within 8 weeks needed to improve safety with ambulation  2  Pt will improve FGA score to at least 28/30 within 8 weeks needed to reduce fall risk  3  Pt will be able to maintain balance on nomcompliant surfaces with eyes closed for 30 seconds in semitandem position within 8 weeks needed to reduce fall risk  4  Pt will demonstrate independence with HEP within 8 weeks  5  Pt will demonstrate normal DVA testing without 8 weeks      Plan  Patient would benefit from: skilled physical therapy  Planned therapy interventions: balance, neuromuscular re-education, patient education, canalith repositioning, therapeutic exercise, therapeutic training, home exercise program and gait training  Frequency: 2x week  Duration in weeks: 8  Plan of Care beginning date: 9/28/2020  Plan of Care expiration date: 11/27/2020  Treatment plan discussed with: patient        Subjective Evaluation    History of Present Illness  Mechanism of injury: Diagnosed with left vestibular schwannoma  Symptoms started 2 years ago  Has gotten worse in past few months  Officially diagnosed in Aug this year  States they won't operate on it  Currently with significant vertigo, is dizzy on the time  Having issues with his balance, tends to drift to left side  Denies falls recently (did fall off a ladder about a month ago), but has been furniture walking more in past few weeks  Self-employed, audio/visual installations, still working, pushing through, works from home  Has hearing loss on left side, right ear is fine  Ringing in left ear is out of control  Started with headaches, rapid onset, that started in last month, starts in back of left ear  Does get shooting pain in left ear, more so recently  Has horrible vision, has had bad vision forever  Unsure if anything changed for the worse with his vision recently  Sees eye doctor mid-October  Pain  Location: left ear  Quality: sharp and radiating    Social Support  Steps to enter house: yes  Stairs in house: yes   Lives in: multiple-level home  Lives with: spouse and young children    Employment status: working    Diagnostic Tests  MRI studies: abnormal        Objective   Neuro Exam:     Dizziness  Positive for disequilibrium, vertigo and motion sickness  Sharp-Esvin: negative  Vertebral Artery Screen: negative  Cervical AROM: WFL, no pain  DixHallpike: Right: negative; Left: negative  Roll Test: Right negative;  Left negative    Modified Clinical Test of Sensory Interaction on Balance (normed on ages 19-56, lower number is less sway or better static balance)  30 eyes open firm surface  30 eyes closed firm surface  30 eyes open foam surface  30 eyes closed foam surface, some increased sway initially    DVA: abnormal, lost 3-4 lines    Gaze stabilization: normal in room light    FGA: 22/30  Gait speed:  94 m/s without AD    VOMS (Vestibular/Ocular Motor Screen)     Smooth pursuit Normal, complaints of eye strain     Saccades (horizontal) Normal     Saccades (vertical)  Normal     Convergence (near point)--Normal, reports of eye straining     VOR (horizontal) Normal, increased dizziness     VOR (vertical) Normal, increased dizziness     Visual Motion Sensitivity Normal, increased dizziness         Precautions: L acoustic schwannoma, anxiety      Manuals                                                                 Neuro Re-Ed                                                                                                        Ther Ex                                                                                                                     Ther Activity                                       Gait Training                                       Modalities

## 2020-10-01 ENCOUNTER — OFFICE VISIT (OUTPATIENT)
Dept: PHYSICAL THERAPY | Facility: CLINIC | Age: 50
End: 2020-10-01
Payer: COMMERCIAL

## 2020-10-01 DIAGNOSIS — D33.3 ACOUSTIC NEUROMA (HCC): ICD-10-CM

## 2020-10-01 DIAGNOSIS — R42 VERTIGO: Primary | ICD-10-CM

## 2020-10-01 PROCEDURE — 97112 NEUROMUSCULAR REEDUCATION: CPT | Performed by: PHYSICAL THERAPIST

## 2020-10-07 ENCOUNTER — OFFICE VISIT (OUTPATIENT)
Dept: PHYSICAL THERAPY | Facility: CLINIC | Age: 50
End: 2020-10-07
Payer: COMMERCIAL

## 2020-10-07 DIAGNOSIS — D33.3 ACOUSTIC NEUROMA (HCC): ICD-10-CM

## 2020-10-07 DIAGNOSIS — R42 VERTIGO: Primary | ICD-10-CM

## 2020-10-07 PROCEDURE — 97112 NEUROMUSCULAR REEDUCATION: CPT | Performed by: PHYSICAL THERAPIST

## 2020-10-08 ENCOUNTER — OFFICE VISIT (OUTPATIENT)
Dept: PHYSICAL THERAPY | Facility: CLINIC | Age: 50
End: 2020-10-08
Payer: COMMERCIAL

## 2020-10-08 DIAGNOSIS — D33.3 ACOUSTIC NEUROMA (HCC): ICD-10-CM

## 2020-10-08 DIAGNOSIS — R42 VERTIGO: Primary | ICD-10-CM

## 2020-10-08 PROCEDURE — 97112 NEUROMUSCULAR REEDUCATION: CPT | Performed by: PHYSICAL THERAPIST

## 2020-10-12 ENCOUNTER — OFFICE VISIT (OUTPATIENT)
Dept: PHYSICAL THERAPY | Facility: CLINIC | Age: 50
End: 2020-10-12
Payer: COMMERCIAL

## 2020-10-12 DIAGNOSIS — D33.3 ACOUSTIC NEUROMA (HCC): ICD-10-CM

## 2020-10-12 DIAGNOSIS — R42 VERTIGO: Primary | ICD-10-CM

## 2020-10-12 PROCEDURE — 97112 NEUROMUSCULAR REEDUCATION: CPT | Performed by: PHYSICAL THERAPIST

## 2020-10-14 ENCOUNTER — OFFICE VISIT (OUTPATIENT)
Dept: PHYSICAL THERAPY | Facility: CLINIC | Age: 50
End: 2020-10-14
Payer: COMMERCIAL

## 2020-10-14 DIAGNOSIS — D33.3 ACOUSTIC NEUROMA (HCC): ICD-10-CM

## 2020-10-14 DIAGNOSIS — R42 VERTIGO: Primary | ICD-10-CM

## 2020-10-14 PROCEDURE — 97112 NEUROMUSCULAR REEDUCATION: CPT | Performed by: PHYSICAL THERAPIST

## 2020-10-14 PROCEDURE — 97150 GROUP THERAPEUTIC PROCEDURES: CPT | Performed by: PHYSICAL THERAPIST

## 2020-10-19 ENCOUNTER — OFFICE VISIT (OUTPATIENT)
Dept: PHYSICAL THERAPY | Facility: CLINIC | Age: 50
End: 2020-10-19
Payer: COMMERCIAL

## 2020-10-19 DIAGNOSIS — E78.5 HYPERLIPIDEMIA, UNSPECIFIED HYPERLIPIDEMIA TYPE: ICD-10-CM

## 2020-10-19 DIAGNOSIS — R42 VERTIGO: ICD-10-CM

## 2020-10-19 DIAGNOSIS — D33.3 ACOUSTIC NEUROMA (HCC): Primary | ICD-10-CM

## 2020-10-19 PROCEDURE — 97112 NEUROMUSCULAR REEDUCATION: CPT | Performed by: PHYSICAL THERAPIST

## 2020-10-19 RX ORDER — ATORVASTATIN CALCIUM 40 MG/1
40 TABLET, FILM COATED ORAL DAILY
Qty: 90 TABLET | Refills: 0 | Status: SHIPPED | OUTPATIENT
Start: 2020-10-19 | End: 2021-01-12

## 2020-10-22 ENCOUNTER — OFFICE VISIT (OUTPATIENT)
Dept: PHYSICAL THERAPY | Facility: CLINIC | Age: 50
End: 2020-10-22
Payer: COMMERCIAL

## 2020-10-22 DIAGNOSIS — R42 VERTIGO: ICD-10-CM

## 2020-10-22 DIAGNOSIS — D33.3 ACOUSTIC NEUROMA (HCC): Primary | ICD-10-CM

## 2020-10-22 PROCEDURE — 97150 GROUP THERAPEUTIC PROCEDURES: CPT | Performed by: PHYSICAL THERAPIST

## 2020-10-22 PROCEDURE — 97112 NEUROMUSCULAR REEDUCATION: CPT | Performed by: PHYSICAL THERAPIST

## 2020-10-26 ENCOUNTER — OFFICE VISIT (OUTPATIENT)
Dept: PHYSICAL THERAPY | Facility: CLINIC | Age: 50
End: 2020-10-26
Payer: COMMERCIAL

## 2020-10-26 DIAGNOSIS — R42 VERTIGO: ICD-10-CM

## 2020-10-26 DIAGNOSIS — D33.3 ACOUSTIC NEUROMA (HCC): Primary | ICD-10-CM

## 2020-10-26 PROCEDURE — 97112 NEUROMUSCULAR REEDUCATION: CPT | Performed by: PHYSICAL THERAPIST

## 2020-10-29 ENCOUNTER — EVALUATION (OUTPATIENT)
Dept: PHYSICAL THERAPY | Facility: CLINIC | Age: 50
End: 2020-10-29
Payer: COMMERCIAL

## 2020-10-29 DIAGNOSIS — D33.3 ACOUSTIC NEUROMA (HCC): Primary | ICD-10-CM

## 2020-10-29 DIAGNOSIS — R42 VERTIGO: ICD-10-CM

## 2020-10-29 PROCEDURE — 97116 GAIT TRAINING THERAPY: CPT | Performed by: PHYSICAL THERAPIST

## 2020-10-29 PROCEDURE — 97112 NEUROMUSCULAR REEDUCATION: CPT | Performed by: PHYSICAL THERAPIST

## 2020-10-29 PROCEDURE — 97530 THERAPEUTIC ACTIVITIES: CPT | Performed by: PHYSICAL THERAPIST

## 2021-01-11 DIAGNOSIS — E78.5 HYPERLIPIDEMIA, UNSPECIFIED HYPERLIPIDEMIA TYPE: ICD-10-CM

## 2021-01-12 RX ORDER — ATORVASTATIN CALCIUM 40 MG/1
TABLET, FILM COATED ORAL
Qty: 90 TABLET | Refills: 0 | Status: SHIPPED | OUTPATIENT
Start: 2021-01-12 | End: 2021-04-14

## 2021-04-14 DIAGNOSIS — E78.5 HYPERLIPIDEMIA, UNSPECIFIED HYPERLIPIDEMIA TYPE: ICD-10-CM

## 2021-04-14 RX ORDER — ATORVASTATIN CALCIUM 40 MG/1
TABLET, FILM COATED ORAL
Qty: 90 TABLET | Refills: 0 | Status: SHIPPED | OUTPATIENT
Start: 2021-04-14 | End: 2021-07-14

## 2021-05-15 ENCOUNTER — OFFICE VISIT (OUTPATIENT)
Dept: URGENT CARE | Facility: CLINIC | Age: 51
End: 2021-05-15
Payer: COMMERCIAL

## 2021-05-15 VITALS — HEIGHT: 70 IN | BODY MASS INDEX: 26.92 KG/M2 | WEIGHT: 188 LBS

## 2021-05-15 DIAGNOSIS — B96.89 ACUTE BACTERIAL SINUSITIS: Primary | ICD-10-CM

## 2021-05-15 DIAGNOSIS — J01.90 ACUTE BACTERIAL SINUSITIS: Primary | ICD-10-CM

## 2021-05-15 PROCEDURE — 99203 OFFICE O/P NEW LOW 30 MIN: CPT | Performed by: PHYSICIAN ASSISTANT

## 2021-05-15 RX ORDER — AMOXICILLIN AND CLAVULANATE POTASSIUM 875; 125 MG/1; MG/1
1 TABLET, FILM COATED ORAL EVERY 12 HOURS SCHEDULED
Qty: 14 TABLET | Refills: 0 | Status: SHIPPED | OUTPATIENT
Start: 2021-05-15 | End: 2021-05-22

## 2021-05-15 NOTE — PROGRESS NOTES
[facility]        NAME: Cornell Suarez is a 45 y o  male  : 1970    MRN: [id number]  DATE: May 15, 2021  TIME: 2:23 PM    Assessment and Plan   Acute bacterial sinusitis [J01 90, B96 89]  1  Acute bacterial sinusitis  amoxicillin-clavulanate (AUGMENTIN) 875-125 mg per tablet         Patient Instructions     Take antibiotic as directed with food  Recommend probiotics for side effects  Continue with over-the-counter symptom relief  Monitor for worsening symptoms    Follow up with PCP in 3-5 days  Proceed to  ER if symptoms worsen  Chief Complaint     Chief Complaint   Patient presents with    Cold Like Symptoms     nasal congestion and cough for approx 2 weeks   Cough         History of Present Illness       Patient presents with complaint of increasing sinus congestion and facial pain for the past 2 weeks  Patient reports that he has underlying allergic rhinitis but states that the congestion has been worse than his baseline  He reports some facial tenderness over his right maxillary sinus  He denies fever, chills, sweats, chest tightness, dyspnea, abdominal pain, vomiting, and diarrhea  He denies known recent sick contacts  Patient states that he is completely vaccinated against COVID-19  He denies recent antibiotic use and known antibiotic allergies  Review of Systems   Review of Systems   Constitutional: Negative for chills and fever  HENT: Positive for congestion, sinus pressure and sinus pain  Negative for ear pain and sore throat  Eyes: Negative for pain and visual disturbance  Respiratory: Negative for cough and shortness of breath  Cardiovascular: Negative for chest pain and palpitations  Gastrointestinal: Negative for abdominal pain and vomiting  Genitourinary: Negative for dysuria and hematuria  Musculoskeletal: Negative for arthralgias and back pain  Skin: Negative for color change and rash  Neurological: Negative for seizures and syncope     All other systems reviewed and are negative          Current Medications       Current Outpatient Medications:     atorvastatin (LIPITOR) 40 mg tablet, TAKE 1 TABLET BY MOUTH EVERY DAY, Disp: 90 tablet, Rfl: 0    azelastine (ASTELIN) 0 1 % nasal spray, 1 spray into each nostril 2 (two) times a day as needed Use in each nostril as directed , Disp: , Rfl:     loratadine (CLARITIN) 10 mg tablet, Take 10 mg by mouth daily as needed , Disp: , Rfl:     albuterol (PROVENTIL HFA,VENTOLIN HFA) 90 mcg/act inhaler, Inhale 2 puffs every 6 (six) hours as needed, Disp: , Rfl:     amoxicillin-clavulanate (AUGMENTIN) 875-125 mg per tablet, Take 1 tablet by mouth every 12 (twelve) hours for 7 days, Disp: 14 tablet, Rfl: 0    Epinastine HCl 0 05 % ophthalmic solution, INSTILL 1 DROP INTO BOTH EYES 4 TIMES A DAY AS NEEDED, Disp: , Rfl:     fluticasone (FLOVENT HFA) 110 MCG/ACT inhaler, Inhale 2 puffs 2 (two) times a day as needed Rinse mouth after use , Disp: , Rfl:     naproxen (NAPROSYN) 500 mg tablet, Take 1 tablet (500 mg total) by mouth 2 (two) times a day with meals (Patient taking differently: Take 500 mg by mouth as needed ), Disp: 60 tablet, Rfl: 3    zolpidem (AMBIEN) 5 mg tablet, Take 1 tablet (5 mg total) by mouth daily at bedtime as needed for sleep (Patient taking differently: Take 2 5 mg by mouth as needed for sleep ), Disp: 30 tablet, Rfl: 2    Current Allergies     Allergies as of 05/15/2021    (No Known Allergies)            The following portions of the patient's history were reviewed and updated as appropriate: allergies, current medications, past family history, past medical history, past social history, past surgical history and problem list      Past Medical History:   Diagnosis Date    Anxiety     Cervical spinal stenosis     Hyperlipidemia     Seasonal allergies     Tinnitus     left ear    TMJ click        Past Surgical History:   Procedure Laterality Date    OTHER SURGICAL HISTORY      Gum graft  OK EXC SKIN BENIG 1 1-2 CM TRUNK,ARM,LEG N/A 12/20/2019    Procedure: EXC Bx MASS LT BACK & RT CHEST;  Surgeon: John Duvall MD;  Location: [facility];  Service: General    VASECTOMY      WISDOM TOOTH EXTRACTION         Family History   Problem Relation Age of Onset    Cancer Mother         Possibly ovarian     Other Father         Premature coronary heart disease    Hypertension Father     Other Family         Premature Coronary Heart Disease    Other Family         Multiple Allergies    Rheum arthritis Brother          Medications have been verified  Objective   Ht 5' 10" (1 778 m)   Wt 85 3 kg (188 lb)   BMI 26 98 kg/m²   No LMP for male patient  Physical Exam     Physical Exam  Vitals signs and nursing note reviewed  Constitutional:       General: He is not in acute distress  Appearance: Normal appearance  He is well-developed  He is not ill-appearing or diaphoretic  HENT:      Head: Normocephalic and atraumatic  Right Ear: Tympanic membrane, ear canal and external ear normal       Left Ear: Tympanic membrane, ear canal and external ear normal       Nose: Congestion present  Comments: TTP over right maxillary sinus     Mouth/Throat:      Mouth: Mucous membranes are moist       Pharynx: Oropharynx is clear  Eyes:      Conjunctiva/sclera: Conjunctivae normal       Pupils: Pupils are equal, round, and reactive to light  Neck:      Musculoskeletal: Normal range of motion and neck supple  Cardiovascular:      Rate and Rhythm: Normal rate and regular rhythm  Heart sounds: Normal heart sounds  Pulmonary:      Effort: Pulmonary effort is normal  No respiratory distress  Breath sounds: Normal breath sounds  No stridor  No wheezing, rhonchi or rales  Lymphadenopathy:      Cervical: No cervical adenopathy  Skin:     General: Skin is warm and dry  Capillary Refill: Capillary refill takes less than 2 seconds  Findings: No rash     Neurological: Mental Status: He is alert and oriented to person, place, and time  Cranial Nerves: No cranial nerve deficit  Sensory: No sensory deficit  Psychiatric:         Behavior: Behavior normal          Thought Content:  Thought content normal

## 2021-05-18 ENCOUNTER — OFFICE VISIT (OUTPATIENT)
Dept: AUDIOLOGY | Age: 51
End: 2021-05-18
Payer: COMMERCIAL

## 2021-05-18 DIAGNOSIS — H93.12 TINNITUS OF LEFT EAR: ICD-10-CM

## 2021-05-18 DIAGNOSIS — D33.3 VESTIBULAR SCHWANNOMA (HCC): ICD-10-CM

## 2021-05-18 DIAGNOSIS — D33.3 ACOUSTIC NEUROMA (HCC): ICD-10-CM

## 2021-05-18 DIAGNOSIS — H90.42 SENSORINEURAL HEARING LOSS (SNHL) OF LEFT EAR WITH UNRESTRICTED HEARING OF RIGHT EAR: Primary | ICD-10-CM

## 2021-05-18 PROCEDURE — 92567 TYMPANOMETRY: CPT | Performed by: AUDIOLOGIST

## 2021-05-18 PROCEDURE — 92557 COMPREHENSIVE HEARING TEST: CPT | Performed by: AUDIOLOGIST

## 2021-05-18 NOTE — PROGRESS NOTES
HEARING EVALUATION    Name:  Raffy Diaz  :  1970  Age:  45 y o  Date of Evaluation: 21     History: Sudden Loss  Reason for visit: Raffy Diaz is being seen today at the request of Aletta Gilford for an evaluation of hearing  The patient was previously seen by this facility on 20, when audiometric testing revealed a mild precipitously dropping to moderately severe/severe hearing loss at 1,000 through 8,000Hz  Today, the patient reports that he noticed an significant improvement in his left ear hearing over the past few weeks  He is here today to determine whether his thresholds have improved, as he would like a hearing test to prove the viability of his auditory nerve and re-consideration for surgery of his acoustic neuroma  EVALUATION:    Otoscopic Evaluation:   Right Ear: Minimum cerumen    Left Ear: Clear canal, TM visualized  Tympanometry:   Right: Type A - normal middle ear pressure and compliance   Left: Type A - normal middle ear pressure and compliance    Audiogram Results:  Right ear:  Essentially normal peripheral hearing sensitivity  WRS was excellent  Left ear:  Normal at 250Hz, precipitously dropping to moderately severe/severe sensorineural hearing loss at 750Hz through 8,000Hz  Hearing is essentially stable from 2020 except for a 25dBHL drop at 750Hz in the left ear  There was improvement in left WRS, from 16% on 2020 to 56% today  Patient matched his tinnitus to 6,000Hz  He requested loudness matching today, and matched to a loudness level of 75 dBHL  *see attached audiogram      RECOMMENDATIONS:  Annual hearing eval, Return to [facility]  for F/U and Copy to Patient/Caregiver    PATIENT EDUCATION:   Discussed results and recommendations with patient  Questions were addressed and the patient was encouraged to contact our department should concerns arise        Nathaniel Vences , CCC-A  Clinical Audiologist

## 2021-06-02 ENCOUNTER — HOSPITAL ENCOUNTER (OUTPATIENT)
Dept: MRI IMAGING | Facility: HOSPITAL | Age: 51
Discharge: HOME | End: 2021-06-02
Payer: COMMERCIAL

## 2021-06-02 DIAGNOSIS — H90.42 SENSORINEURAL HEARING LOSS (SNHL) OF LEFT EAR WITH UNRESTRICTED HEARING OF RIGHT EAR: ICD-10-CM

## 2021-06-02 DIAGNOSIS — D33.3 VESTIBULAR SCHWANNOMA (HCC): ICD-10-CM

## 2021-06-02 PROCEDURE — 70553 MRI BRAIN STEM W/O & W/DYE: CPT

## 2021-06-02 PROCEDURE — A9585 GADOBUTROL INJECTION: HCPCS | Performed by: RADIOLOGY

## 2021-06-02 PROCEDURE — G1004 CDSM NDSC: HCPCS

## 2021-06-02 RX ADMIN — GADOBUTROL 8 ML: 604.72 INJECTION INTRAVENOUS at 07:49

## 2021-10-04 ENCOUNTER — HOSPITAL ENCOUNTER (OUTPATIENT)
Dept: RADIOLOGY | Facility: HOSPITAL | Age: 51
Discharge: HOME | End: 2021-10-04
Attending: INTERNAL MEDICINE
Payer: COMMERCIAL

## 2021-10-04 DIAGNOSIS — E78.00 PURE HYPERCHOLESTEROLEMIA, UNSPECIFIED: ICD-10-CM

## 2021-10-04 PROCEDURE — 75571 CT HRT W/O DYE W/CA TEST: CPT

## 2021-10-04 PROCEDURE — G1004 CDSM NDSC: HCPCS

## 2021-11-01 ENCOUNTER — APPOINTMENT (OUTPATIENT)
Dept: LAB | Facility: HOSPITAL | Age: 51
End: 2021-11-01
Attending: INTERNAL MEDICINE
Payer: COMMERCIAL

## 2021-11-01 DIAGNOSIS — E55.9 VITAMIN D DEFICIENCY: ICD-10-CM

## 2021-11-01 DIAGNOSIS — Z00.00 ROUTINE GENERAL MEDICAL EXAMINATION AT A HEALTH CARE FACILITY: ICD-10-CM

## 2021-11-01 DIAGNOSIS — Z12.5 SPECIAL SCREENING FOR MALIGNANT NEOPLASM OF PROSTATE: ICD-10-CM

## 2021-11-01 LAB
25(OH)D3 SERPL-MCNC: 22.2 NG/ML (ref 30–100)
ALBUMIN SERPL BCP-MCNC: 3.8 G/DL (ref 3.5–5)
ALP SERPL-CCNC: 97 U/L (ref 46–116)
ALT SERPL W P-5'-P-CCNC: 46 U/L (ref 12–78)
ANION GAP SERPL CALCULATED.3IONS-SCNC: 5 MMOL/L (ref 4–13)
AST SERPL W P-5'-P-CCNC: 21 U/L (ref 5–45)
BASOPHILS # BLD AUTO: 0.03 THOUSANDS/ΜL (ref 0–0.1)
BASOPHILS NFR BLD AUTO: 1 % (ref 0–1)
BILIRUB SERPL-MCNC: 0.81 MG/DL (ref 0.2–1)
BUN SERPL-MCNC: 12 MG/DL (ref 5–25)
CALCIUM SERPL-MCNC: 9.4 MG/DL (ref 8.3–10.1)
CHLORIDE SERPL-SCNC: 108 MMOL/L (ref 100–108)
CHOLEST SERPL-MCNC: 128 MG/DL (ref 50–200)
CO2 SERPL-SCNC: 26 MMOL/L (ref 21–32)
CREAT SERPL-MCNC: 0.99 MG/DL (ref 0.6–1.3)
EOSINOPHIL # BLD AUTO: 0.04 THOUSAND/ΜL (ref 0–0.61)
EOSINOPHIL NFR BLD AUTO: 1 % (ref 0–6)
ERYTHROCYTE [DISTWIDTH] IN BLOOD BY AUTOMATED COUNT: 13.2 % (ref 11.6–15.1)
GFR SERPL CREATININE-BSD FRML MDRD: 87 ML/MIN/1.73SQ M
GLUCOSE P FAST SERPL-MCNC: 105 MG/DL (ref 65–99)
HCT VFR BLD AUTO: 44.6 % (ref 36.5–49.3)
HDLC SERPL-MCNC: 44 MG/DL
HGB BLD-MCNC: 14.9 G/DL (ref 12–17)
IMM GRANULOCYTES # BLD AUTO: 0.03 THOUSAND/UL (ref 0–0.2)
IMM GRANULOCYTES NFR BLD AUTO: 1 % (ref 0–2)
LDLC SERPL CALC-MCNC: 68 MG/DL (ref 0–100)
LYMPHOCYTES # BLD AUTO: 1.02 THOUSANDS/ΜL (ref 0.6–4.47)
LYMPHOCYTES NFR BLD AUTO: 15 % (ref 14–44)
MCH RBC QN AUTO: 29.9 PG (ref 26.8–34.3)
MCHC RBC AUTO-ENTMCNC: 33.4 G/DL (ref 31.4–37.4)
MCV RBC AUTO: 90 FL (ref 82–98)
MONOCYTES # BLD AUTO: 0.39 THOUSAND/ΜL (ref 0.17–1.22)
MONOCYTES NFR BLD AUTO: 6 % (ref 4–12)
NEUTROPHILS # BLD AUTO: 5.1 THOUSANDS/ΜL (ref 1.85–7.62)
NEUTS SEG NFR BLD AUTO: 76 % (ref 43–75)
NONHDLC SERPL-MCNC: 84 MG/DL
NRBC BLD AUTO-RTO: 0 /100 WBCS
PLATELET # BLD AUTO: 202 THOUSANDS/UL (ref 149–390)
PMV BLD AUTO: 10 FL (ref 8.9–12.7)
POTASSIUM SERPL-SCNC: 4.1 MMOL/L (ref 3.5–5.3)
PROT SERPL-MCNC: 7.5 G/DL (ref 6.4–8.2)
PSA SERPL-MCNC: 0.8 NG/ML (ref 0–4)
RBC # BLD AUTO: 4.98 MILLION/UL (ref 3.88–5.62)
SODIUM SERPL-SCNC: 139 MMOL/L (ref 136–145)
TRIGL SERPL-MCNC: 82 MG/DL
WBC # BLD AUTO: 6.61 THOUSAND/UL (ref 4.31–10.16)

## 2021-11-01 PROCEDURE — 85025 COMPLETE CBC W/AUTO DIFF WBC: CPT

## 2021-11-01 PROCEDURE — 80053 COMPREHEN METABOLIC PANEL: CPT

## 2021-11-01 PROCEDURE — 82306 VITAMIN D 25 HYDROXY: CPT

## 2021-11-01 PROCEDURE — G0103 PSA SCREENING: HCPCS

## 2021-11-01 PROCEDURE — 80061 LIPID PANEL: CPT

## 2021-11-01 PROCEDURE — 36415 COLL VENOUS BLD VENIPUNCTURE: CPT

## 2023-01-24 NOTE — DISCHARGE INSTRUCTIONS
[facility]  Post-Operative Care Instructions  Dr Porfirio Francis MD, [facility]  [phone]    1  General: You will feel pulling sensations around the wound or funny aches and pains around the incisions  This is normal  Even minor surgery is a change in your body and this is your bodys way of reaction to it  If you have had abdominal surgery, it may help to support the incision with a small pillow or blanket for comfort when moving or coughing  2  Wound care:  Okay to shower  The glue will fall off over the next week or 2     3  Water: You may shower over the wound, unless there are drain tubes left in place  Do not bathe or use a pool or hot tub until cleared by the physician  4  Activity: You may go up and down stairs, walk as much as you are comfortable, but walk at least 3 times each day  If you have had abdominal surgery, do not lift anything heavier than 20 pounds for at least 4 weeks, unless cleared by the doctor  5  Diet: You may resume a regular diet  If you had a same-day surgery or overnight stay surgery, he may wish to eat lightly for a few days: soups, crackers, and sandwiches  You may resume a regular diet when ready  6  Medications: Resume all of your previous medications, unless told otherwise by the doctor  A good option for pain control is to start with Tylenol and ibuprofen and alternate taking them every 2 hours for 1-2 days  If this is not sufficient then substituted the Tylenol for the narcotic pain medicine prescribed  Insure that you do not take more than 4000 mg of Tylenol per day  You do not need to take the narcotic pain medications unless you are having significant pain and discomfort  7  Driving: You will need someone to drive you home on the day of surgery  Do not drive or make any important decisions while on narcotic pain medication or 24 hours and after anesthesia or sedation for surgery   Generally, you may drive when your off all narcotic pain Urology follows   medications  8  Upset Stomach: You may take Maalox, Tums, or similar items for an upset stomach  If your narcotic pain medication causes an upset stomach, do not take it on an empty stomach  Try taking it with at least some crackers or toast      9  Constipation: Patients often experienced constipation after surgery  You may take over-the-counter medication for this, such as Metamucil, Senokot, Dulcolax, milk of magnesia, etc  You may take a suppository unless you have had anorectal surgery such as a procedure on your hemorrhoids  If you experience significant nausea or vomiting after abdominal surgery, call the office before trying any of these medications  10  Call the office: If you are experiencing any of the following, fevers above 101 5°, significant nausea or vomiting, if the wound develops drainage and/or is excessive redness around the wound, or if you have significant diarrhea or other worsening symptoms  11  Pain: You may be given a prescription for pain  This will be given to the hospital, the day of surgery  12  Sexual Activity: You may resume sexual activity when you feel ready and comfortable and your incision is sealed and healed without apparent infection risk

## (undated) DEVICE — SUT VICRYL 2-0 SH 27 IN UNDYED J417H

## (undated) DEVICE — GLOVE SRG BIOGEL 6.5

## (undated) DEVICE — GLOVE INDICATOR PI UNDERGLOVE SZ 8 BLUE

## (undated) DEVICE — GLOVE INDICATOR PI UNDERGLOVE SZ 6.5 BLUE

## (undated) DEVICE — SUT ETHILON 2-0 FS 18 IN 664H

## (undated) DEVICE — 3M™ TEGADERM™ TRANSPARENT FILM DRESSING FRAME STYLE, 1626W, 4 IN X 4-3/4 IN (10 CM X 12 CM), 50/CT 4CT/CASE: Brand: 3M™ TEGADERM™

## (undated) DEVICE — CHLORAPREP HI-LITE 26ML ORANGE

## (undated) DEVICE — GAUZE SPONGES,16 PLY: Brand: CURITY

## (undated) DEVICE — SCD SEQUENTIAL COMPRESSION COMFORT SLEEVE MEDIUM KNEE LENGTH: Brand: KENDALL SCD

## (undated) DEVICE — BETHLEHEM UNIVERSAL MINOR GEN: Brand: CARDINAL HEALTH

## (undated) DEVICE — GLOVE SRG BIOGEL ECLIPSE 7.5

## (undated) DEVICE — SUT VICRYL 3-0 SH 27 IN J416H

## (undated) DEVICE — DRAPE EQUIPMENT RF WAND

## (undated) DEVICE — 2000CC GUARDIAN II: Brand: GUARDIAN

## (undated) DEVICE — INTENDED FOR TISSUE SEPARATION, AND OTHER PROCEDURES THAT REQUIRE A SHARP SURGICAL BLADE TO PUNCTURE OR CUT.: Brand: BARD-PARKER SAFETY BLADES SIZE 15, STERILE